# Patient Record
Sex: MALE | Race: BLACK OR AFRICAN AMERICAN | Employment: FULL TIME | URBAN - METROPOLITAN AREA
[De-identification: names, ages, dates, MRNs, and addresses within clinical notes are randomized per-mention and may not be internally consistent; named-entity substitution may affect disease eponyms.]

---

## 2017-01-10 ENCOUNTER — TELEPHONE (OUTPATIENT)
Dept: ENDOCRINOLOGY | Age: 57
End: 2017-01-10

## 2017-01-10 RX ORDER — LEVOTHYROXINE SODIUM 200 UG/1
200 TABLET ORAL
Qty: 90 TAB | Refills: 3 | Status: SHIPPED | OUTPATIENT
Start: 2017-01-10 | End: 2017-03-14 | Stop reason: DRUGHIGH

## 2017-01-10 NOTE — TELEPHONE ENCOUNTER
I called patient's wife and read Dr. Robinson Solis recommendation and she voiced understanding.   ----- Message from Ines Palacios MD sent at 1/10/2017  4:40 PM EST -----  Regarding: RE: Dr. Rick Fried: [phone]  I increased levothyroxine to 200 mcg and sent this to [facility]    He may feel better with treating hypothyroidism, but I still think seeing a counselor will help. Hypothyroidism affects people differently, some people do have mood changes and feel tired. People typically need more sleep, but sleep can be affected.    ----- Message -----     From: Debbie Munoz LPN     Sent: 5/69/4487   9:32 AM       To: Ines Palacios MD  Subject: RE: Dr. Stella Sierra                                     Patient returned my call. He is not taking carafate any longer. He does not take any vitamins or minerals. The only new medication he started was xanax. He takes levothyroxine with his morning coffee which he adds cream and sugar. He waits at least 30-60 minutes before eating. This is not new, this is how he has been taking levothyroxine. He stated at one time in the past he was missing doses, but started taking it more consistently after his last appointment with Dr. Stella Sierra. He may miss a dose here or there, but thinks this happens less than once per week. He complains of depression, anxiety, difficulty sleeping. He wonders if this is related to his thyroid, or if he needs to see a counselor?   ----- Message -----     From: Debbie Munoz LPN     Sent: 9/4/7458   5:00 PM       To: Debbie Munoz LPN  Subject: FW: Dr. Stella Sierra                                     Need to confirm he is not taking any new medications (carafate?), vitamins/minerals, and how he is taking levothyroxine (time of day ect. ).   ----- Message -----     From: Debbie Munoz LPN     Sent: 1/1/7165   4:00 PM       To: Ines Palacios MD  Subject: RE: Dr. Kwasi Mcguire spoke to patient's wife.  She called patient while I was on the phone with her and confirmed he had not missed any doses of levothyroxine in the 8 weeks prior to having labs drawn.   ----- Message -----     From: Jose Garcia MD     Sent: 1/9/2017   2:57 PM       To: Jaret Galeana LPN  Subject: RE: Dr. Ok Bender                                     TSH was 54. At his visit in 10/2016, his TSH was elevated and this was due to him missing doses rather frequently and also taking a medication (carafate) that hindered absorption of levothyroxine. Before adjusting his dose from 175 mcg, I would verify whether he has been taking levothyroxine consistently over the 8 weeks prior to the labs. Even missing 1-2 tablets/week can make a big difference      ----- Message -----     From: Jaret Galeana LPN     Sent: 3/9/6941   2:28 PM       To: Jose Garcia MD  Subject: FW: Dr. Michelle Oliva spoke with patient's wife. She stated patient complains of increased anxiety and depression in the past 1-2 months. He saw his PCP in December who andree labs and recommended increasing levothyroxine to 200 mcg (from 175 mcg). Patient has not made this increase and has continued taking 175 mcg. I called PCP for labs and office note from December. Please advise.   ----- Message -----     From: Alex Major     Sent: 1/9/2017  10:11 AM       To: Jaret Galeana LPN  Subject: Dr. Ok Bender                                         Patients wife Basim Ceballos called regarding patients medication, she stated patient has been having mood swings they may be caused by thyroid.  She would like a call back at [phone]

## 2017-01-30 ENCOUNTER — OFFICE VISIT (OUTPATIENT)
Dept: ENDOCRINOLOGY | Age: 57
End: 2017-01-30

## 2017-01-30 VITALS
SYSTOLIC BLOOD PRESSURE: 137 MMHG | DIASTOLIC BLOOD PRESSURE: 86 MMHG | BODY MASS INDEX: 30.62 KG/M2 | HEART RATE: 73 BPM | HEIGHT: 68 IN | WEIGHT: 202 LBS

## 2017-01-30 DIAGNOSIS — E78.5 DYSLIPIDEMIA: ICD-10-CM

## 2017-01-30 DIAGNOSIS — E11.9 TYPE 2 DIABETES MELLITUS WITHOUT COMPLICATION, WITHOUT LONG-TERM CURRENT USE OF INSULIN (HCC): ICD-10-CM

## 2017-01-30 DIAGNOSIS — I10 HTN (HYPERTENSION), BENIGN: ICD-10-CM

## 2017-01-30 DIAGNOSIS — E89.0 POSTOPERATIVE HYPOTHYROIDISM: Primary | ICD-10-CM

## 2017-01-30 NOTE — PROGRESS NOTES
History of Present Illness: Soraida Fernández is a 55 y.o. male presents for follow-up of hypothyroidism post-surgical  He also has HTN, and dyslipidemia. Hypothyroid since thyroidectomy was done in summer 2012. Dose has fluctuated considerably. Dose was as high as 300 mcg in 2014    TSH was at goal. On equivalent of 150 mcg   TSH much higher in fall 2015 and dose increased from 150 to 175 - thought due to PPI use  Dose further increased to 200 mcg daily earlier this month  Feeling some better. Did have cold intolerance, which may be a little better. Anxiety has improved. Sleep is a little better. Diabetes -  Medications: Taking metformin 1 g BID    Exercise -still exercising. Lipids: rosuvastatin 20 mg - replaces atorvsatatin. .     Elevated BP - + history of microalbuminuria. - BP in 120s at home. Taking amlodipine/benazepril at night. Social:  . Wife is helping with diet        Past Medical History   Diagnosis Date    Diabetes mellitus type II     Dyslipidemia     Goiter     Hypothyroidism      Current Outpatient Prescriptions   Medication Sig    levothyroxine (SYNTHROID) 200 mcg tablet Take 1 Tab by mouth Daily (before breakfast). For thyroid    metFORMIN (GLUCOPHAGE) 1,000 mg tablet TAKE 1 TABLET TWICE A DAY WITH MEALS    rosuvastatin (CRESTOR) 20 mg tablet Take 1 Tab by mouth nightly. For cholesterol. Replaces atorvastatin    amLODIPine-benazepril (LOTREL) 5-40 mg per capsule Take 1 Cap by mouth nightly. For blood pressure. No current facility-administered medications for this visit.       No Known Allergies    Review of Systems:  - Eyes: no blurry vision or double vision  - Cardiovascular: no chest pain  - Respiratory: no shortness of breath  - Musculoskeletal: no myalgias  - Neurological: no numbness/tingling in extremities    Physical Examination:  Visit Vitals    /86    Pulse 73    Ht 5' 8\" (1.727 m)    Wt 202 lb (91.6 kg)    BMI 30.71 kg/m2   -   - General: pleasant, no distress, normal gait   HEENT: hearing intact, EOMI, clear sclera without icterus  - Cardiovascular: regular, normal rate   - Respiratory: normal effort  - Integumentary: no edema  - Psychiatric: normal mood and affect    Data Reviewed:   Component      Latest Ref Rng & Units 10/14/2016 10/14/2016 10/14/2016 10/14/2016          12:00 AM 12:00 AM 12:00 AM 12:00 AM   Glucose      65 - 99 mg/dL    141 (H)   BUN      6 - 24 mg/dL    12   Creatinine      0.76 - 1.27 mg/dL    1.25   GFR est non-AA      >59 mL/min/1.73    64   GFR est AA      >59 mL/min/1.73    74   BUN/Creatinine ratio      9 - 20    10   Sodium      134 - 144 mmol/L    138   Potassium      3.5 - 5.2 mmol/L    4.6   Chloride      97 - 108 mmol/L    95 (L)   CO2      18 - 29 mmol/L    27   Calcium      8.7 - 10.2 mg/dL    9.8   Cholesterol, total      100 - 199 mg/dL   347 (H)    Triglyceride      0 - 149 mg/dL   181 (H)    HDL Cholesterol      >39 mg/dL   61    VLDL, calculated      5 - 40 mg/dL   36    LDL, calculated      0 - 99 mg/dL   250 (H)    Comment         Comment    Hemoglobin A1c, (calculated)      4.8 - 5.6 %  6.4 (H)     Estimated average glucose      mg/dL  137     TSH      0.450 - 4.500 uIU/mL 42.220 (H)          Assessment/Plan:   1. Postoperative hypothyroidism   - very challenging to manage. Doses has fluctuated considerably  - labs on 200 mcg and follow closely with labs until TSH is at goal    2. Type 2 diabetes mellitus without complication, without long-term current use of insulin (HCC)  - continue metformin 1000 mg twice daily can be tolerated. -  Encouraged dietary efforts   3. Dyslipidemia - improved per Dr Bijan Dominguez labs. Continue rosuvastatin    4. HTN (hypertension), benign continue amlodipine/benazepril     Patient Instructions   Thyroid:  Labs on 200 mcg today.   Adjust    Repeat labs again in 6 weeks and in 3 months to follow closely    Diabetes:  Watch carbohydrate intake with meals and aim to eat less than 45 grams per meal.  Continue metformin - 1000 mg twice daily     Goals for blood sugars:  Fasting  (less than 150) -Normal is < 100  Other times -  (less than 180). Normal is < 130    Cholesterol:   Continue rosuvastatin 20 mg. Continue weight loss efforts    Blood pressure  - continue amlodipine/benazepril    Follow-up Disposition:  Return in about 3 months (around 4/30/2017).     Copy sent to:

## 2017-01-30 NOTE — PATIENT INSTRUCTIONS
Thyroid:  Labs on 200 mcg today. Adjust    Repeat labs again in 6 weeks and in 3 months to follow closely    Diabetes:  Watch carbohydrate intake with meals and aim to eat less than 45 grams per meal.  Continue metformin - 1000 mg twice daily     Goals for blood sugars:  Fasting  (less than 150) -Normal is < 100  Other times -  (less than 180). Normal is < 130    Cholesterol:   Continue rosuvastatin 20 mg.       Continue weight loss efforts    Blood pressure  - continue amlodipine/benazepril

## 2017-01-30 NOTE — MR AVS SNAPSHOT
Visit Information Date & Time Provider Department Dept. Phone Encounter #  
 1/30/2017  8:50 AM Criselda Benson, [address] [facility] [phone] Follow-up Instructions Return in about 3 months (around 4/30/2017). Your Appointments 2/6/2017  9:50 AM  
ROUTINE CARE with MD Tenzin [facility]) Appt Note: F/U DIABETES CP0.00  
 [address] [facility] [address] [address]  
[address] [facility] [address]  
  
    
 2/16/2017  2:00 PM  
New Patient with Amado Page NP [facility] ([facility]) Appt Note: new pt for eval for med management for stress and anxiety referred by [employer] [address] [facility] [address] [facility] Jennifer Natarajan [address]  
  
   
 [address] [facility] [address] [id number] Upcoming Health Maintenance Date Due Hepatitis C Screening 1960 Pneumococcal 19-64 Medium Risk (1 of 1 - PPSV23) 3/27/1980 DTaP/Tdap/Td series (1 - Tdap) 3/27/1982 FOBT Q 1 YEAR AGE 50-75 3/27/2011 INFLUENZA AGE 9 TO ADULT 8/1/2016 EYE EXAM RETINAL OR DILATED Q1 8/27/2016 MICROALBUMIN Q1 2/19/2017 FOOT EXAM Q1 2/22/2017 HEMOGLOBIN A1C Q6M 4/14/2017 LIPID PANEL Q1 10/14/2017 Allergies as of 1/30/2017  Review Complete On: 1/30/2017 By: Criselda Benson MD  
 No Known Allergies Current Immunizations  Never Reviewed No immunizations on file. Not reviewed this visit You Were Diagnosed With   
  
 Codes Comments Type 2 diabetes mellitus without complication, without long-term current use of insulin (HCC)    -  Primary ICD-10-CM: E11.9 ICD-9-CM: 250.00 Postoperative hypothyroidism     ICD-10-CM: E89.0 ICD-9-CM: 244.0 Dyslipidemia     ICD-10-CM: E78.5 ICD-9-CM: 272.4 HTN (hypertension), benign     ICD-10-CM: I10 
ICD-9-CM: 401.1 Vitals BP Pulse Height(growth percentile) Weight(growth percentile) BMI Smoking Status 137/86 73 5' 8\" (1.727 m) 202 lb (91.6 kg) 30.71 kg/m2 Former Smoker Vitals History BMI and BSA Data Body Mass Index Body Surface Area 30.71 kg/m 2 2.1 m 2 Preferred Pharmacy Pharmacy Name Phone [address] [phone] Your Updated Medication List  
  
   
This list is accurate as of: 1/30/17  9:45 AM.  Always use your most recent med list. amLODIPine-benazepril 5-40 mg per capsule Commonly known as:  Zabrina Loupe Take 1 Cap by mouth nightly. For blood pressure. levothyroxine 200 mcg tablet Commonly known as:  SYNTHROID Take 1 Tab by mouth Daily (before breakfast). For thyroid  
  
 metFORMIN 1,000 mg tablet Commonly known as:  GLUCOPHAGE  
TAKE 1 TABLET TWICE A DAY WITH MEALS  
  
 rosuvastatin 20 mg tablet Commonly known as:  CRESTOR Take 1 Tab by mouth nightly. For cholesterol. Replaces atorvastatin We Performed the Following HEMOGLOBIN A1C WITH EAG [85517 CPT(R)] T4, FREE Y206167 CPT(R)] TSH 3RD GENERATION [18353 CPT(R)] Follow-up Instructions Return in about 3 months (around 4/30/2017). To-Do List   
 03/13/2017 Lab:  T4, FREE   
  
 03/13/2017 Lab:  TSH 3RD GENERATION Patient Instructions Thyroid: 
Labs on 200 mcg today. Adjust 
 
Repeat labs again in 6 weeks and in 3 months to follow closely Diabetes: 
Watch carbohydrate intake with meals and aim to eat less than 45 grams per meal. 
Continue metformin - 1000 mg twice daily Goals for blood sugars: 
Fasting  (less than 150) -Normal is < 100 Other times -  (less than 180). Normal is < 130 Cholesterol:  
Continue rosuvastatin 20 mg. Continue weight loss efforts Blood pressure 
- continue amlodipine/benazepril Introducing [facility]! Erasmo Gavin introduces [employer] patient portal. Now you can access parts of your medical record, email your doctor's office, and request medication refills online. 1. In your internet browser, go to [email or web address] 2. Click on the First Time User? Click Here link in the Sign In box. You will see the New Member Sign Up page. 3. Enter your [employer] Access Code exactly as it appears below. You will not need to use this code after youve completed the sign-up process. If you do not sign up before the expiration date, you must request a new code. · [employer] Access Code: [email or web address] Expires: 4/30/2017  9:45 AM 
 
4. Enter the last four digits of your Social Security Number (xxxx) and Date of Birth (mm/dd/yyyy) as indicated and click Submit. You will be taken to the next sign-up page. 5. Create a [facility] [employer] ID. This will be your [employer] login ID and cannot be changed, so think of one that is secure and easy to remember. 6. Create a [employer] password. You can change your password at any time. 7. Enter your Password Reset Question and Answer. This can be used at a later time if you forget your password. 8. Enter your e-mail address. You will receive e-mail notification when new information is available in [address] 9. Click Sign Up. You can now view and download portions of your medical record. 10. Click the Download Summary menu link to download a portable copy of your medical information. If you have questions, please visit the Frequently Asked Questions section of the [employer] website. Remember, [employer] is NOT to be used for urgent needs. For medical emergencies, dial 911. Now available from your iPhone and Android! Please provide this summary of care documentation to your next provider. Your primary care clinician is listed as Chichi Encarnacion If you have any questions after today's visit, please call [phone].

## 2017-01-31 LAB
EST. AVERAGE GLUCOSE BLD GHB EST-MCNC: 134 MG/DL
HBA1C MFR BLD: 6.3 % (ref 4.8–5.6)
T4 FREE SERPL-MCNC: 2.84 NG/DL (ref 0.82–1.77)
TSH SERPL DL<=0.005 MIU/L-ACNC: 0.11 UIU/ML (ref 0.45–4.5)

## 2017-02-17 DIAGNOSIS — E78.5 DYSLIPIDEMIA: ICD-10-CM

## 2017-02-17 DIAGNOSIS — E11.9 TYPE 2 DIABETES MELLITUS WITHOUT COMPLICATION, WITHOUT LONG-TERM CURRENT USE OF INSULIN (HCC): ICD-10-CM

## 2017-02-17 DIAGNOSIS — E03.9 HYPOTHYROIDISM, ADULT: ICD-10-CM

## 2017-02-20 ENCOUNTER — OFFICE VISIT (OUTPATIENT)
Dept: BEHAVIORAL/MENTAL HEALTH CLINIC | Age: 57
End: 2017-02-20

## 2017-02-20 VITALS
HEIGHT: 68 IN | BODY MASS INDEX: 29.7 KG/M2 | RESPIRATION RATE: 18 BRPM | SYSTOLIC BLOOD PRESSURE: 175 MMHG | DIASTOLIC BLOOD PRESSURE: 89 MMHG | HEART RATE: 61 BPM | OXYGEN SATURATION: 98 % | WEIGHT: 196 LBS | TEMPERATURE: 98.9 F

## 2017-02-20 DIAGNOSIS — Z86.59 HISTORY OF PANIC ATTACKS: ICD-10-CM

## 2017-02-20 DIAGNOSIS — F32.1 MODERATE SINGLE CURRENT EPISODE OF MAJOR DEPRESSIVE DISORDER (HCC): Primary | ICD-10-CM

## 2017-02-20 RX ORDER — ALPRAZOLAM 1 MG/1
TABLET ORAL
Refills: 0 | COMMUNITY
Start: 2017-02-17 | End: 2017-03-13 | Stop reason: SDUPTHER

## 2017-02-20 RX ORDER — ESCITALOPRAM OXALATE 20 MG/1
TABLET ORAL
Refills: 0 | COMMUNITY
Start: 2017-02-13 | End: 2017-02-20

## 2017-02-20 RX ORDER — FLUOXETINE 10 MG/1
CAPSULE ORAL
Qty: 14 CAP | Refills: 0 | Status: SHIPPED | OUTPATIENT
Start: 2017-02-20 | End: 2017-03-13

## 2017-02-20 RX ORDER — FLUOXETINE HYDROCHLORIDE 20 MG/1
20 CAPSULE ORAL DAILY
Qty: 30 CAP | Refills: 0 | Status: SHIPPED | OUTPATIENT
Start: 2017-02-20 | End: 2017-03-13

## 2017-02-20 NOTE — PROGRESS NOTES
Initial Psychiatric Assessment    ID: Mary Siddiqui is a 55 y.o. yo   male referred by his insurance co for treatment of stress and anxiety. He attends the session with his wife. Chief Complaint: \"I don't feel right. \"    HPI: Mary Siddiqui is a 55 y.o. yo male who presents with symptoms of depression and anxiety. His PMH is significant for DM type 2, dyslipidemia, and hypothyroidism secondary to thyroidectomy in 2012. He has no history of inpt or outpt psychiatric care. He denies a history substance abuse or physical/ sexual/ emotional abuse. He lost a daughter secondary to SIDS and another daughter in an MVC when she was 19yo. He recently lost a cousin and a close friend who passed from a self-inflicted GSW in Nov 8661. Mr. Lucinda Gaytan reports onset of depression and anxiety with a panic attack occurring late Nov- early Dec 2106. Depressive symptoms include sad moods, poor motivation and energy, poor appetite, anhedonia (shopping, yard work, eating out), hypersomnolence, nervousness, hopeless and helpless feelings, and generalized anxiety. He doubts himself and is not as confident in his decisions. 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 He also reports a happy and stable home life with his wife of 11 years and their children. Mr. Lucinda Gaytan worries about small things that occur at the job; issues that he has been adept and confident at handling previously. He notes urges to isolate and spends a large amount of his time at home asleep. His behavior has been noted by his family who are concerned about him. He has several guns that he keeps in a safe, but denies SI. His PCP prescribed Xanax in Dec 2016 and Mr. Lucinda Gaytan thinks that this has been helpful for his anxiety. He uses it sparingly. He denies psychosis, no symptoms of margoth/ hypomania reported. He sees Dr. Maritza Burrows for management of hypothyroidism.  Last TSH 1/30/17= 0.113 and T4= 2.84. Ms. Lemus Found reports that his dose of Synthroid was decreased 2 weeks ago. Past Psychiatric History:  Meds: Current: Xanax 1mg-2mg x 4-4 days out of the week since Dec 2016              Past: Lexapro 20mg- prescribed by his PCP and took x 1 dose and felt jittery and had insomnia   Outpt Treatment: Current: denies                               Past: denies  Past Hospitalizations: denies  Suicide attempts? no  Family hx of suicide? NO  Self injurious behaviors: denies       Past Medical History:  Treva Villela III, MD    Current Meds:   Current Outpatient Prescriptions   Medication Sig Dispense Refill    ALPRAZolam (XANAX) 1 mg tablet   0    FLUoxetine (PROZAC) 10 mg capsule Take 1 capsule by mouth daily x 2 weeks. 14 Cap 0    FLUoxetine (PROZAC) 20 mg capsule Take 1 Cap by mouth daily. 30 Cap 0    levothyroxine (SYNTHROID) 200 mcg tablet Take 1 Tab by mouth Daily (before breakfast). For thyroid 90 Tab 3    metFORMIN (GLUCOPHAGE) 1,000 mg tablet TAKE 1 TABLET TWICE A DAY WITH MEALS 180 Tab 3    rosuvastatin (CRESTOR) 20 mg tablet Take 1 Tab by mouth nightly. For cholesterol. Replaces atorvastatin 90 Tab 3    amLODIPine-benazepril (LOTREL) 5-40 mg per capsule Take 1 Cap by mouth nightly. For blood pressure. 80 Cap 3        PMH:   Past Medical History   Diagnosis Date    Diabetes mellitus type II     Dyslipidemia     Goiter     Hypothyroidism        Family History: denies      Social History:  Family Dynamics: Raised in [place] by a single mother. His father passed secondary to cancer when he was a young child. He has a sister and a brother.  x 11 years to his 2nd wife. He has 2 sons and an adult daughter.    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 .       Vital Signs:   Visit Vitals    /89 (BP 1 Location: Right arm, BP Patient Position: Sitting)    Pulse 61    Temp 98.9 °F (37.2 °C) (Oral)    Resp 18    Ht 5' 8\" (1.727 m)    Wt 88.9 kg (196 lb)    SpO2 98%    BMI 29.8 kg/m2       Labs:   Results for orders placed or performed in visit on 01/30/17   TSH 3RD GENERATION   Result Value Ref Range    TSH 0.113 (L) 0.450 - 4.500 uIU/mL   T4, FREE   Result Value Ref Range    T4, Free 2.84 (H) 0.82 - 1.77 ng/dL   HEMOGLOBIN A1C WITH EAG   Result Value Ref Range    Hemoglobin A1c 6.3 (H) 4.8 - 5.6 %    Estimated average glucose 134 mg/dL       MSE: Mental Status exam: WNL except for    Sensorium  oriented to time, place and person   Relations cooperative    Eye Contact    appropriate   Appearance:  age appropriate and casually dressed   Motor Behavior:  gait stable and within normal limits   Speech:  normal pitch, normal volume and non-pressured   Thought Process: goal directed and logical   Thought Content free of delusions, free of hallucinations and not internally preoccupied    Suicidal ideations none   Homicidal ideations none   Mood:  euthymic   Affect:  euthymic   Memory recent  adequate   Memory remote:  adequate   Concentration:  adequate   Abstraction:  abstract   Insight:  good   Reliability good   Judgment:  good       Assessment: This is a 50yo man with no known genetic loading for a mental health issue and no personal history of substance abuse. He denies a histroy of physical, sexual, or emotional abuse. He lost one daughter secondary to SIDS and another daughter at 25yo in an MVC. He recently lost a cousin and a close friend who passed unexpectedly secondary to a self-inflicted gunshot wound. He is employed, , and has a good support system.  Ms. Quinn Doyle is struggling with sudden onset of first time depression. This episode may be due to cumulative affects of several losses (2 daughters in the past and recently a cousin and close friend), low TSH with elevated T4, or from another issue. Diagnoses:     ICD-10-CM ICD-9-CM    1. Moderate single current episode of major depressive disorder (HCC) F32.1 296.22    2. History of panic attacks Z86.59 V11.8          Plan:  1. Meds/ Labs: Start Prozac 10mg every day x 2 weeks and then increase dose to 20mg every day for depression and anxiety. Rxs provided. He will continue Xanax 0.5-1mg every day PRN severe anxiety. Mr. Fabi Brock reports that he does not need a refill on this medication. the risks and benefits of the proposed medication  the potential medication side effects  GI disturbance, libido decreased, weight gain  patient given opportunity to ask questions  2. Psychotherapy: his wife made him an appt with a therapist in the community  2. Dispo: f/u in 1 month    Risks/ Benefits/ Options of meds d/w patient and patient agrees to these medications. Patient instructed to call with any side effects.     Sarah Mario NP  2/20/2017

## 2017-03-06 ENCOUNTER — TELEPHONE (OUTPATIENT)
Dept: BEHAVIORAL/MENTAL HEALTH CLINIC | Age: 57
End: 2017-03-06

## 2017-03-06 RX ORDER — HYDROXYZINE 50 MG/1
TABLET, FILM COATED ORAL
Qty: 14 TAB | Refills: 0 | Status: SHIPPED | OUTPATIENT
Start: 2017-03-06 | End: 2017-03-13

## 2017-03-06 NOTE — TELEPHONE ENCOUNTER
Spoke with Ms. Rafal Cooper who reports slight improvement to his generalized anxiety and depression. He just increased in Prozac dose to 20mg every day. He is struggling with insomnia and is awake around 12:30-1am every night. Melatonin has not been helpful. Rx for Hydroxyzine 25-50mg qhs sent to his pharmacy. He will call provider back in a few days to inform of his progress.

## 2017-03-06 NOTE — TELEPHONE ENCOUNTER
Pt's wife called and expressed that her  was having problems, I guess with his medication. He can be reached at the number provided, please call back when you have a moment.

## 2017-03-10 ENCOUNTER — HOSPITAL ENCOUNTER (EMERGENCY)
Age: 57
Discharge: HOME | End: 2017-03-10
Attending: EMERGENCY MEDICINE
Payer: COMMERCIAL

## 2017-03-10 VITALS
BODY MASS INDEX: 28.4 KG/M2 | HEIGHT: 68 IN | DIASTOLIC BLOOD PRESSURE: 96 MMHG | RESPIRATION RATE: 16 BRPM | TEMPERATURE: 98.5 F | SYSTOLIC BLOOD PRESSURE: 158 MMHG | WEIGHT: 187.38 LBS | HEART RATE: 66 BPM | OXYGEN SATURATION: 96 %

## 2017-03-10 DIAGNOSIS — G47.00 INSOMNIA, UNSPECIFIED TYPE: ICD-10-CM

## 2017-03-10 DIAGNOSIS — R42 DIZZINESS: ICD-10-CM

## 2017-03-10 DIAGNOSIS — R00.0 TACHYCARDIA: Primary | ICD-10-CM

## 2017-03-10 LAB
ALBUMIN SERPL BCP-MCNC: 4.1 G/DL (ref 3.5–5)
ALBUMIN/GLOB SERPL: 1 {RATIO} (ref 1.1–2.2)
ALP SERPL-CCNC: 72 U/L (ref 45–117)
ALT SERPL-CCNC: 20 U/L (ref 12–78)
ANION GAP BLD CALC-SCNC: 10 MMOL/L (ref 5–15)
AST SERPL W P-5'-P-CCNC: 11 U/L (ref 15–37)
BASOPHILS # BLD AUTO: 0 K/UL (ref 0–0.1)
BASOPHILS # BLD: 0 % (ref 0–1)
BILIRUB SERPL-MCNC: 0.6 MG/DL (ref 0.2–1)
BUN SERPL-MCNC: 8 MG/DL (ref 6–20)
BUN/CREAT SERPL: 8 (ref 12–20)
CALCIUM SERPL-MCNC: 9.2 MG/DL (ref 8.5–10.1)
CHLORIDE SERPL-SCNC: 100 MMOL/L (ref 97–108)
CO2 SERPL-SCNC: 29 MMOL/L (ref 21–32)
CREAT SERPL-MCNC: 1.02 MG/DL (ref 0.7–1.3)
EOSINOPHIL # BLD: 0 K/UL (ref 0–0.4)
EOSINOPHIL NFR BLD: 0 % (ref 0–7)
ERYTHROCYTE [DISTWIDTH] IN BLOOD BY AUTOMATED COUNT: 12 % (ref 11.5–14.5)
GLOBULIN SER CALC-MCNC: 4.2 G/DL (ref 2–4)
GLUCOSE SERPL-MCNC: 163 MG/DL (ref 65–100)
HCT VFR BLD AUTO: 42.5 % (ref 36.6–50.3)
HGB BLD-MCNC: 14.3 G/DL (ref 12.1–17)
LYMPHOCYTES # BLD AUTO: 17 % (ref 12–49)
LYMPHOCYTES # BLD: 1.4 K/UL (ref 0.8–3.5)
MCH RBC QN AUTO: 30.8 PG (ref 26–34)
MCHC RBC AUTO-ENTMCNC: 33.6 G/DL (ref 30–36.5)
MCV RBC AUTO: 91.4 FL (ref 80–99)
MONOCYTES # BLD: 0.5 K/UL (ref 0–1)
MONOCYTES NFR BLD AUTO: 6 % (ref 5–13)
NEUTS SEG # BLD: 6.3 K/UL (ref 1.8–8)
NEUTS SEG NFR BLD AUTO: 77 % (ref 32–75)
PLATELET # BLD AUTO: 297 K/UL (ref 150–400)
POTASSIUM SERPL-SCNC: 3.1 MMOL/L (ref 3.5–5.1)
PROT SERPL-MCNC: 8.3 G/DL (ref 6.4–8.2)
RBC # BLD AUTO: 4.65 M/UL (ref 4.1–5.7)
SODIUM SERPL-SCNC: 139 MMOL/L (ref 136–145)
T4 FREE SERPL-MCNC: 2 NG/DL (ref 0.8–1.5)
TROPONIN I SERPL-MCNC: <0.04 NG/ML
WBC # BLD AUTO: 8.2 K/UL (ref 4.1–11.1)

## 2017-03-10 PROCEDURE — 85025 COMPLETE CBC W/AUTO DIFF WBC: CPT | Performed by: STUDENT IN AN ORGANIZED HEALTH CARE EDUCATION/TRAINING PROGRAM

## 2017-03-10 PROCEDURE — 36415 COLL VENOUS BLD VENIPUNCTURE: CPT | Performed by: STUDENT IN AN ORGANIZED HEALTH CARE EDUCATION/TRAINING PROGRAM

## 2017-03-10 PROCEDURE — 84484 ASSAY OF TROPONIN QUANT: CPT | Performed by: STUDENT IN AN ORGANIZED HEALTH CARE EDUCATION/TRAINING PROGRAM

## 2017-03-10 PROCEDURE — 84439 ASSAY OF FREE THYROXINE: CPT | Performed by: EMERGENCY MEDICINE

## 2017-03-10 PROCEDURE — 80053 COMPREHEN METABOLIC PANEL: CPT | Performed by: STUDENT IN AN ORGANIZED HEALTH CARE EDUCATION/TRAINING PROGRAM

## 2017-03-10 PROCEDURE — 93005 ELECTROCARDIOGRAM TRACING: CPT

## 2017-03-10 PROCEDURE — 99283 EMERGENCY DEPT VISIT LOW MDM: CPT

## 2017-03-10 RX ORDER — ZOLPIDEM TARTRATE 10 MG/1
10 TABLET ORAL
Qty: 10 TAB | Refills: 0 | Status: SHIPPED | OUTPATIENT
Start: 2017-03-10 | End: 2017-03-28

## 2017-03-10 NOTE — ED TRIAGE NOTES
Triage note: pt started with feeling like his heart was racing about 2 hours ago with dizziness. Pt currently denies heart racing. Pt recently started on Prozac and after problems sleeping. Pt report tingling in both hands when his heart was racing.

## 2017-03-11 LAB
ATRIAL RATE: 69 BPM
CALCULATED P AXIS, ECG09: 33 DEGREES
CALCULATED R AXIS, ECG10: 9 DEGREES
CALCULATED T AXIS, ECG11: 32 DEGREES
DIAGNOSIS, 93000: NORMAL
P-R INTERVAL, ECG05: 132 MS
Q-T INTERVAL, ECG07: 448 MS
QRS DURATION, ECG06: 108 MS
QTC CALCULATION (BEZET), ECG08: 480 MS
VENTRICULAR RATE, ECG03: 69 BPM

## 2017-03-11 NOTE — ED PROVIDER NOTES
HPI Comments: 55 y.o. male with past medical history significant for DM, goiter, dyslipidemia, hypothyroidism, HTN, and anxiety who presents from home with chief complaint of anxiety. Pt reports taking Prozac for approximately 3 weeks and has been experiencing insomnia for 1-2 days. Pt states that he was unable to sleep last night and felt anxious throughout the day after waking up this morning. Pt also states that he feels as though his heart is \"racing\" and doesn't \"feel right\". He denies having any syncope, chest pain, headache, or abdominal pain. Patient denies any hx of heart problems. There are no other acute medical concerns at this time. Social hx: former smoker, rare EtOH use, no drug use  PCP: Layne Saldaña MD    Note written by Raphael Young, as dictated by Jessica Wan MD 7:28 PM      The history is provided by the patient. No  was used. Past Medical History:   Diagnosis Date    Diabetes mellitus type II     Dyslipidemia     Goiter     Hypertension     Hypothyroidism     Psychiatric disorder     aniexty       Past Surgical History:   Procedure Laterality Date    HX HEMORRHOIDECTOMY      HX ORTHOPAEDIC      back    HX THYROIDECTOMY      summer 2012         Family History:   Problem Relation Age of Onset    Diabetes Mother     Diabetes Brother     Diabetes Sister        Social History     Social History    Marital status:      Spouse name: N/A    Number of children: N/A    Years of education: N/A     Occupational History    Not on file. Social History Main Topics    Smoking status: Former Smoker     Quit date: 1/1/1984    Smokeless tobacco: Not on file    Alcohol use Yes      Comment: infrequent    Drug use: No    Sexual activity: Not on file     Other Topics Concern    Not on file     Social History Narrative         ALLERGIES: Review of patient's allergies indicates no known allergies.     Review of Systems   Constitutional: Negative for fever. Eyes: Negative for visual disturbance. Respiratory: Negative for cough, shortness of breath and wheezing. Cardiovascular: Positive for palpitations. Negative for chest pain and leg swelling. Gastrointestinal: Negative for abdominal pain, diarrhea, nausea and vomiting. Genitourinary: Negative for dysuria. Musculoskeletal: Negative. Negative for back pain and neck stiffness. Skin: Negative for rash. Neurological: Negative. Negative for syncope and headaches. Psychiatric/Behavioral: Negative for confusion. The patient is nervous/anxious. All other systems reviewed and are negative. Vitals:    03/10/17 1808   BP: (!) 164/91   Pulse: 95   Resp: 18   Temp: 98.5 °F (36.9 °C)   SpO2: 97%   Weight: 85 kg (187 lb 6 oz)   Height: 5' 8\" (1.727 m)            Physical Exam   Constitutional: He appears well-developed and well-nourished. No distress. HENT:   Head: Normocephalic. Eyes: Pupils are equal, round, and reactive to light. Neck: Normal range of motion. Cardiovascular: Normal rate and regular rhythm. No murmur heard. Pulmonary/Chest: Effort normal and breath sounds normal. No respiratory distress. Abdominal: Soft. There is no tenderness. Musculoskeletal: Normal range of motion. He exhibits no edema. Neurological: He is alert. He has normal strength. No cranial nerve deficit. Skin: Skin is warm and dry. Psychiatric: He has a normal mood and affect. His behavior is normal.   Nursing note and vitals reviewed. Note written by Raphael Arrington, as dictated by Keke Busby MD 7:28 PM    [facility]  ED Course       Procedures  ED EKG interpretation:  Rhythm: normal sinus rhythm; and regular . Rate (approx.): 69; Axis: normal; ST/T wave: non-specific changes. Note written by Raphael Arrington, as dictated by Keke Busby MD 7:28 PM      Advised pt that he should decrease prozac to 10 mg ( d/t insomnia).  Also , he needs to talk c his pcp about his synthroid dose - free t4 level is slightly elevated. Pt requested something to help him sleep.

## 2017-03-11 NOTE — DISCHARGE INSTRUCTIONS
Dizziness: Care Instructions  Your Care Instructions  Dizziness is the feeling of unsteadiness or fuzziness in your head. It is different than having vertigo, which is a feeling that the room is spinning or that you are moving or falling. It is also different from lightheadedness, which is the feeling that you are about to faint. It can be hard to know what causes dizziness. Some people feel dizzy when they have migraine headaches. Sometimes bouts of flu can make you feel dizzy. Some medical conditions, such as heart problems or high blood pressure, can make you feel dizzy. Many medicines can cause dizziness, including medicines for high blood pressure, pain, or anxiety. If a medicine causes your symptoms, your doctor may recommend that you stop or change the medicine. If it is a problem with your heart, you may need medicine to help your heart work better. If there is no clear reason for your symptoms, your doctor may suggest watching and waiting for a while to see if the dizziness goes away on its own. Follow-up care is a key part of your treatment and safety. Be sure to make and go to all appointments, and call your doctor if you are having problems. It's also a good idea to know your test results and keep a list of the medicines you take. How can you care for yourself at home? · If your doctor recommends or prescribes medicine, take it exactly as directed. Call your doctor if you think you are having a problem with your medicine. · Do not drive while you feel dizzy. · Try to prevent falls. Steps you can take include:  ¨ Using nonskid mats, adding grab bars near the tub, and using night-lights. ¨ Clearing your home so that walkways are free of anything you might trip on. ¨ Letting family and friends know that you have been feeling dizzy. This will help them know how to help you. When should you call for help? Call 911 anytime you think you may need emergency care.  For example, call if:  · You passed out (lost consciousness). · You have dizziness along with symptoms of a heart attack. These may include:  ¨ Chest pain or pressure, or a strange feeling in the chest.  ¨ Sweating. ¨ Shortness of breath. ¨ Nausea or vomiting. ¨ Pain, pressure, or a strange feeling in the back, neck, jaw, or upper belly or in one or both shoulders or arms. ¨ Lightheadedness or sudden weakness. ¨ A fast or irregular heartbeat. · You have symptoms of a stroke. These may include:  ¨ Sudden numbness, tingling, weakness, or loss of movement in your face, arm, or leg, especially on only one side of your body. ¨ Sudden vision changes. ¨ Sudden trouble speaking. ¨ Sudden confusion or trouble understanding simple statements. ¨ Sudden problems with walking or balance. ¨ A sudden, severe headache that is different from past headaches. Call your doctor now or seek immediate medical care if:  · You feel dizzy and have a fever, headache, or ringing in your ears. · You have new or increased nausea and vomiting. · Your dizziness does not go away or comes back. Watch closely for changes in your health, and be sure to contact your doctor if:  · You do not get better as expected. Where can you learn more? Go to http://patrick-yana.info/. Enter C054 in the search box to learn more about \"Dizziness: Care Instructions. \"  Current as of: May 27, 2016  Content Version: 11.1  © 0694-4542 [employer]. Care instructions adapted under license by [employer] (which disclaims liability or warranty for this information). If you have questions about a medical condition or this instruction, always ask your healthcare professional. [place] [address] any warranty or liability for your use of this information. Insomnia: Care Instructions  Your Care Instructions  Insomnia is the inability to sleep well. It is a common problem for most people at some time.  Insomnia may make it hard for you to get to sleep, stay asleep, or sleep as long as you need to. This can make you tired and grouchy during the day. It can also make you forgetful, less effective at work, and unhappy. Insomnia can be caused by conditions such as depression or anxiety. Pain can also affect your ability to sleep. When these problems are solved, the insomnia usually clears up. But sometimes bad sleep habits can cause insomnia. If insomnia is affecting your work or your enjoyment of life, you can take steps to improve your sleep. Follow-up care is a key part of your treatment and safety. Be sure to make and go to all appointments, and call your doctor if you are having problems. It's also a good idea to know your test results and keep a list of the medicines you take. How can you care for yourself at home? What to avoid  · Do not have drinks with caffeine, such as coffee or black tea, for 8 hours before bed. · Do not smoke or use other types of tobacco near bedtime. Nicotine is a stimulant and can keep you awake. · Avoid drinking alcohol late in the evening, because it can cause you to wake in the middle of the night. · Do not eat a big meal close to bedtime. If you are hungry, eat a light snack. · Do not drink a lot of water close to bedtime, because the need to urinate may wake you up during the night. · Do not read or watch TV in bed. Use the bed only for sleeping and sexual activity. What to try  · Go to bed at the same time every night, and wake up at the same time every morning. Do not take naps during the day. · Keep your bedroom quiet, dark, and cool. · Sleep on a comfortable pillow and mattress. · If watching the clock makes you anxious, turn it facing away from you so you cannot see the time. · If you worry when you lie down, start a worry book. Well before bedtime, write down your worries, and then set the book and your concerns aside.   · Try meditation or other relaxation techniques before you go to bed. · If you cannot fall asleep, get up and go to another room until you feel sleepy. Do something relaxing. Repeat your bedtime routine before you go to bed again. · Make your house quiet and calm about an hour before bedtime. Turn down the lights, turn off the TV, log off the computer, and turn down the volume on music. This can help you relax after a busy day. When should you call for help? Watch closely for changes in your health, and be sure to contact your doctor if:  · Your efforts to improve your sleep do not work. · Your insomnia gets worse. · You have been feeling down, depressed, or hopeless or have lost interest in things that you usually enjoy. Where can you learn more? Go to http://patrick-yana.info/. Enter P513 in the search box to learn more about \"Insomnia: Care Instructions. \"  Current as of: July 26, 2016  Content Version: 11.1  © 2263-6462 [employer]. Care instructions adapted under license by [employer] (which disclaims liability or warranty for this information). If you have questions about a medical condition or this instruction, always ask your healthcare professional. [address] any warranty or liability for your use of this information.

## 2017-03-13 ENCOUNTER — TELEPHONE (OUTPATIENT)
Dept: ENDOCRINOLOGY | Age: 57
End: 2017-03-13

## 2017-03-13 ENCOUNTER — TELEPHONE (OUTPATIENT)
Dept: BEHAVIORAL/MENTAL HEALTH CLINIC | Age: 57
End: 2017-03-13

## 2017-03-13 DIAGNOSIS — E89.0 POSTOPERATIVE HYPOTHYROIDISM: ICD-10-CM

## 2017-03-13 DIAGNOSIS — E03.9 ACQUIRED HYPOTHYROIDISM: Primary | ICD-10-CM

## 2017-03-13 RX ORDER — BUSPIRONE HYDROCHLORIDE 5 MG/1
5 TABLET ORAL
Qty: 90 TAB | Refills: 0 | Status: SHIPPED | OUTPATIENT
Start: 2017-03-13 | End: 2017-04-03 | Stop reason: SDUPTHER

## 2017-03-13 RX ORDER — ALPRAZOLAM 1 MG/1
1 TABLET ORAL
Qty: 20 TAB | Refills: 0 | Status: SHIPPED | OUTPATIENT
Start: 2017-03-13 | End: 2017-04-03 | Stop reason: SDUPTHER

## 2017-03-13 NOTE — PROGRESS NOTES
Spoke with patient who reports increase in Prozac helped his depression, but significantly worsened his anxiety and insomnia. He also felt dizzy. He went to the ED last week. EKG was SR and symptoms went away after he stopped taking the Prozac. He is not taking Hydroxyzine. He was prescribed Ambien 10mg in the ER and is taking this most nights. He admits to taking 2 pills of Ambien last night. He was told not to overtake his medication and that Ambien can be habit forming and he should not use it every night. He affirmed understanding. Ms. Archie Nunes denies current depression but feels significant anxiety. He feels overwhelmed with small things that used to not bother him. He denies SI. He is asking for Xanax PRN and is anxious as he only has 1 pill left from rx provided by his PCP. Importance of not overusing controlled medications d/w Mr. Archie Nunes. Rx called in for Xanax 1mg every day #20 to his pharmacy ([facility]). He also agreed to start Buspar 5mg tid for anxiety. Goal of treatment d/w him: Buspar is main medication for anxiety, Xanax provided as Buspar dose is increased. His appt for 3/20 was cancelled and  staff was given his wife's # (#[phone]) to reschedule for early April. Mr. Archie Nunes was told to call back should he have further issues/ concerns.

## 2017-03-13 NOTE — TELEPHONE ENCOUNTER
Patient's wife called stating when she got home on Friday, patient was very anxious and was pacing. He decided to go to the ED for increased anxiety and was told his thyroid hormone level was abnormal. Patient stated he was taking levothyroxine 200 mcg 1/2 tab on Sundays and a whole tab on all other days. Per ED doctor, patient did not take any levothyroxine on Saturday, he took 1/2 tab on Sunday, and has not yet taken any today because he has been busy. Patient had labs drawn again this morning with an order from Dr. Roger Burks. Patient complains of poor appetite, weight loss, anxiety, trouble sleeping.

## 2017-03-13 NOTE — TELEPHONE ENCOUNTER
Pt called on Friday asking if you had anyone spoken with you regarding him not being able to sleep and having problem with his meds. It also seems that he went to the ER on Friday 3/10 for dizziness,insominia, and irregular heartbeat. His wife also called on Friday. Please call them back as soon as possible.

## 2017-03-14 ENCOUNTER — DOCUMENTATION ONLY (OUTPATIENT)
Dept: BEHAVIORAL/MENTAL HEALTH CLINIC | Age: 57
End: 2017-03-14

## 2017-03-14 LAB
T4 FREE SERPL-MCNC: 1.71 NG/DL (ref 0.82–1.77)
TSH SERPL DL<=0.005 MIU/L-ACNC: 0.09 UIU/ML (ref 0.45–4.5)

## 2017-03-14 RX ORDER — LEVOTHYROXINE SODIUM 175 UG/1
TABLET ORAL
Qty: 90 TAB | Refills: 3 | Status: SHIPPED | OUTPATIENT
Start: 2017-03-14 | End: 2017-07-28 | Stop reason: DRUGHIGH

## 2017-03-14 NOTE — TELEPHONE ENCOUNTER
Joen Rush,  His Free T4 levels have fluctuated a lot over last 2 months, I suspect because how he is taking the levothyroxine has changed. The TSH may lag behind the lowering of the levothyroxine dose. Please confirm how much levothyroxine he has taken over last 4-8 weeks. Has he missed any doses, when did he start skipping or splitting doses? We'll lower his dose from the 200 mcg to a lower dose, but by how much will depend on what he reports.

## 2017-03-14 NOTE — TELEPHONE ENCOUNTER
Called Mr Quinn oDyle  Reviewed labs. Will have him decrease levothyroxine to 175 mcg x 6.5 days a week (162.5 mcg/day average dose)    Will mail lab order for repeat labs in 4 weeks, but can go in 2 weeks if he feels poorly. He las lost about 10% of his body weight since the fall, so this may have decreased his needs some. Reviewed importance of taking levothyroxine consistently. He says he is doing this. Recommended using pill sorter. Discussed how a beta-blocker, such as carvedilol, may help blood pressure and also help diminish his physiological response to stress, which may help symptoms. Will see what Valerie Tracy thinks.

## 2017-03-14 NOTE — TELEPHONE ENCOUNTER
Francesco Mendoza,  TSH was 0.085 (low) and Free T4 was 1.74 (high-normal)  These labs indicate that the amount of levothyroxine he has been getting over last month is too much

## 2017-03-14 NOTE — TELEPHONE ENCOUNTER
I called patient and read Dr. Brennen Robertson recommendation and he voiced understanding. Patient asked what can be done to make his thyroid more manageable? He is becoming frustrated with his thyroid management and stated it is effecting him greatly, and is starting to effect his work. Patient would appreciate a return call from Dr. Bacilio Larsen to discuss his concerns.

## 2017-03-14 NOTE — TELEPHONE ENCOUNTER
200 mcg x 6.5 tablets/week = 185 mcg/day. This was still too much. Please have him decrease to 175 mcg tablet and continue to take 1/2 tablet on Sundays (6.5 tablets/week) for 162.5 mcg/day    He can have TSH and free  T4 checked in 8 weeks.

## 2017-03-14 NOTE — TELEPHONE ENCOUNTER
I spoke with patient and confirmed he has been taking levothyroxine 200 mcg every day and 1/2 tab on Sundays for the past 8 + weeks. Starting this past Saturday (3/11/17) patient held levothyroxine all together. On Sunday (3/12/17) he started taking 1/2 tablet and has continued taking 1/2 tablet everyday.

## 2017-03-14 NOTE — PROGRESS NOTES
Spoke with patient on the phone yesterday. He reported increased anxiety when Prozac dose was increased from 10mg to 20mg, however depression improved. He stopped taking the Prozac and feels better. He is still struggling with generalized anxiety and feels overwhelmed by issues that previously wouldn't have bothered him. He was prescribed Ambien 10mg in the ED and has been taking this most nights. The night before he took 2 pills. Informed Mr. Marianne Gomez of risk of dependency with Ambien, and not to overuse the medication. He was told to try and not take it every night. He denies SI. D/C Prozac and start Buspar 5mg tid for anxiety. Rx sent to his pharmacy. He is asking for a rx for Xanax 1mg- says he has one pill left from his PCP and that he is nervous he will run out. Rx for #20 pills sent to his pharmacy and risk of dependency/ tolerance d/w him. Goal is for Buspar to cover anxiety and Xanax should be used sparingly for panic episodes. Ms. Marianne Gomez affirmed understanding. Will f/u in early April. [facility] desk staff given his wife's # for rescheduling his appt. He was told to call back should he have further issues/ concerns.

## 2017-03-27 ENCOUNTER — TELEPHONE (OUTPATIENT)
Dept: BEHAVIORAL/MENTAL HEALTH CLINIC | Age: 57
End: 2017-03-27

## 2017-03-27 NOTE — TELEPHONE ENCOUNTER
Wife called for , he has some questions and issues with the medication and he would like to speak with you about it. He does have an appt coming up on April 3.

## 2017-03-28 RX ORDER — ZOLPIDEM TARTRATE 5 MG/1
5 TABLET ORAL
Qty: 7 TAB | Refills: 0 | Status: SHIPPED | OUTPATIENT
Start: 2017-03-28 | End: 2017-04-03

## 2017-03-28 NOTE — TELEPHONE ENCOUNTER
Spoke with patient who reports Buspar is helpful for his anxiety and he no longer feels depressed. No side effects. He is not sleeping well- wakes up frequently in the middle of the night. Doesn't have Ambien from ED anymore. Poor sleep is effecting his daytime energy and motivation level. Called in rx for Ambien 5mg qhs #7 to his pharmacy and will see him next week for f/u.

## 2017-03-30 LAB
T4 FREE SERPL-MCNC: 1.82 NG/DL (ref 0.82–1.77)
TSH SERPL DL<=0.005 MIU/L-ACNC: 0.32 UIU/ML (ref 0.45–4.5)

## 2017-03-31 DIAGNOSIS — E89.0 POSTOPERATIVE HYPOTHYROIDISM: Primary | ICD-10-CM

## 2017-03-31 DIAGNOSIS — E11.9 TYPE 2 DIABETES MELLITUS WITHOUT COMPLICATION, WITHOUT LONG-TERM CURRENT USE OF INSULIN (HCC): ICD-10-CM

## 2017-03-31 NOTE — TELEPHONE ENCOUNTER
TSH is improved, but remains low. May still be increasing. Due to problems with anxiety, etc, will have him lower dose to 150 mcg daily (can take 175 mcg - 1/2 on Wed and Sundays for avg daily dose of 150 mcg daily)  Will mail lab letter.

## 2017-03-31 NOTE — LETTER
3/31/2017 4:55 PM 
 
Mr. Ridge Bermeo [address] [facility] [id number] Dear Ridge Bermeo: Please find your most recent results below. Resulted Orders TSH 3RD GENERATION Result Value Ref Range TSH 0.321 (L) 0.450 - 4.500 uIU/mL Narrative Performed at:  [address]  [id number] : Aminata Reyes MD, Phone:  [phone] T4, FREE Result Value Ref Range T4, Free 1.82 (H) 0.82 - 1.77 ng/dL RECOMMENDATIONS: 
TSH (Thyroid Stimulating Hormone): This is a pituitary hormone used to assess thyroid function. It is low, but improved and nearing normal 
 
Free T4- this remains slightly elevated. Please lower levothyroxine dose to 150 mcg. To finish up 175 mcg tablets, can take 1/2 tablet on Wednesdays and Sundays for 6 tablets total/week and 150 mcg/day average dose. Please let us know if you would like to change to 150 mcg strength. Please have labs repeated prior to your visit next month (will include urine test and diabetes and cholesterol related labs too) Please call me if you have any questions: [phone] Sincerely, 
 
 
Rivera To MD

## 2017-04-03 ENCOUNTER — OFFICE VISIT (OUTPATIENT)
Dept: BEHAVIORAL/MENTAL HEALTH CLINIC | Age: 57
End: 2017-04-03

## 2017-04-03 VITALS
HEIGHT: 68 IN | SYSTOLIC BLOOD PRESSURE: 146 MMHG | HEART RATE: 67 BPM | WEIGHT: 185 LBS | DIASTOLIC BLOOD PRESSURE: 88 MMHG | BODY MASS INDEX: 28.04 KG/M2 | OXYGEN SATURATION: 98 %

## 2017-04-03 DIAGNOSIS — F32.1 MODERATE SINGLE CURRENT EPISODE OF MAJOR DEPRESSIVE DISORDER (HCC): Primary | ICD-10-CM

## 2017-04-03 DIAGNOSIS — Z86.59 HISTORY OF PANIC ATTACKS: ICD-10-CM

## 2017-04-03 RX ORDER — ALPRAZOLAM 1 MG/1
1 TABLET ORAL
Qty: 30 TAB | Refills: 0 | Status: SHIPPED | OUTPATIENT
Start: 2017-04-03 | End: 2017-05-05 | Stop reason: SDUPTHER

## 2017-04-03 RX ORDER — BUSPIRONE HYDROCHLORIDE 15 MG/1
15 TABLET ORAL 2 TIMES DAILY
Qty: 60 TAB | Refills: 0 | Status: SHIPPED | OUTPATIENT
Start: 2017-04-03 | End: 2017-04-28

## 2017-04-03 RX ORDER — MIRTAZAPINE 7.5 MG/1
7.5 TABLET, FILM COATED ORAL
Qty: 30 TAB | Refills: 0 | Status: SHIPPED | OUTPATIENT
Start: 2017-04-03 | End: 2017-05-24 | Stop reason: SINTOL

## 2017-04-03 NOTE — PROGRESS NOTES
CHIEF COMPLAINT:  Lynsey Arvizu is a 65 y.o. male and was seen today for follow-up of psychiatric condition and psychotropic medication management. He attends the session with his wife. HPI:    Lynsey Arvizu is a 65 y.o. yo male who presents with symptoms of depression and anxiety. His PMH is significant for DM type 2, dyslipidemia, and hypothyroidism secondary to thyroidectomy in 2012. He has no history of inpt or outpt psychiatric care. He denies a history substance abuse or physical/ sexual/ emotional abuse. He lost a daughter secondary to SIDS and another daughter in an MVC when she was 18yo. He recently lost a cousin and a close friend who passed from a self-inflicted GSW in Nov 0172. Mr. Aaron Starr reports onset of depression and anxiety with a panic attack occurring late Nov- early Dec 2106. Depressive symptoms include sad moods, poor motivation and energy, poor appetite, anhedonia (shopping, yard work, eating out), hypersomnolence, nervousness, hopeless and helpless feelings, and generalized anxiety. He doubts himself and is not as confident in his decisions. 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 He also reports a happy and stable home life with his wife of 11 years and their children. Mr. Aaron Starr worries about small things that occur at the job; issues that he has been adept and confident at handling previously. He notes urges to isolate and spends a large amount of his time at home asleep. His behavior has been noted by his family who are concerned about him. He has several guns that he keeps in a safe, but denies SI. His PCP prescribed Xanax in Dec 2016 and Mr. Aaron Starr thinks that this has been helpful for his anxiety. He uses it sparingly. He denies psychosis, no symptoms of margoth/ hypomania reported. He sees Dr. Emilia Gonsalves for management of thyroid issues. FAMILY/SOCIAL HX:  x 11 years to his 2nd wife. He has 2 sons and an adult daughter. 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, [employer]     REVIEW OF SYSTEMS:  Psychiatric:  depression and anxiety  Appetite:improved and weight decrease by 11 lbs. Sleep: does not feel rested and poor with DMS (maintaining sleep)   Neuro: none reported     Visit Vitals    /88 (BP 1 Location: Left arm, BP Patient Position: Sitting)    Pulse 67    Ht 5' 8\" (1.727 m)    Wt 83.9 kg (185 lb)    SpO2 98%    BMI 28.13 kg/m2       Side Effects:  none    MENTAL STATUS EXAM:   Sensorium  oriented to time, place and person   Relations cooperative   Appearance:  age appropriate and casually dressed   Motor Behavior:  gait stable and within normal limits   Speech:  normal pitch, normal volume and non-pressured   Thought Process: goal directed and logical   Thought Content free of delusions, free of hallucinations and not internally preoccupied    Suicidal ideations none   Homicidal ideations none   Mood:  anxious   Affect:  mood-congruent   Memory recent  adequate   Memory remote:  adequate   Concentration:  adequate   Abstraction:  abstract   Insight:  good   Reliability good   Judgment:  good     MEDICAL DECISION MAKING:  Problems addressed today:    ICD-10-CM ICD-9-CM    1. Moderate single current episode of major depressive disorder (HCC) F32.1 296.22    2. History of panic attacks Z86.59 V11.8        Assessment:   Brody Loo is responding to treatment, symptoms are continued depression and anxiety. He underwent a few adjustements to his medications since last in the office- started on Ambien 5mg qhs and Buspar 5mg tid. He is still taking the Xanax 1mg qhs. Last TSH was 1.82 on 3/29 and, according to Dr. Kimberlyn Quach note, this is gradually improving. He has noted benefit to his moods with Buspar, but still struggles with depression and anxiety in general. He reports continued amotivation and apathy, anhedonia, isolating behaviors, and poor sleep.  He is only getting 4 hrs of sleep with Ambien, either 5mg to 10mg. He is stressed with his job. His wife says that, at times, it is like a dark cloud comes over him. He reports that his appetite has slowly been improving but is still low. He attended therapy sessions x 2 with Eugene Hernández but is not interested in continuing with therapy at this time. Current Outpatient Prescriptions   Medication Sig Dispense Refill    busPIRone (BUSPAR) 15 mg tablet Take 1 Tab by mouth two (2) times a day. 60 Tab 0    ALPRAZolam (XANAX) 1 mg tablet Take 1 Tab by mouth daily as needed for Anxiety. 30 Tab 0    mirtazapine (REMERON) 7.5 mg tablet Take 1 Tab by mouth nightly. 30 Tab 0    levothyroxine (SYNTHROID) 175 mcg tablet One tablet most days and 1/2 tablet on Sundays for 6.5 tablets/week (162.5 mcg/day average daily dose) 90 Tab 3    metFORMIN (GLUCOPHAGE) 1,000 mg tablet TAKE 1 TABLET TWICE A DAY WITH MEALS 180 Tab 3    rosuvastatin (CRESTOR) 20 mg tablet Take 1 Tab by mouth nightly. For cholesterol. Replaces atorvastatin 90 Tab 3    amLODIPine-benazepril (LOTREL) 5-40 mg per capsule Take 1 Cap by mouth nightly. For blood pressure. 90 Cap 3       Plan:   1. Medications/ Labs: Increase Buspar to 15mg bid for anxiety. Continue Xanax 1mg every day for severe anxiety. Start Remeron 7.5mg qhs for depression and anxiety (may also help with sleep and appetite). Rxs provided. He was encouraged in good self-care, i.e. Regular exercise, healthy diet, spending time with his family. 2.  Counseling and coordination of care including instructions for treatment, risks/benefits, risk factor reduction and patient/family education. He agrees with the plan. Patient instructed to call with any side effects, questions or issues. 3.  Follow-up Disposition:  Return in about 1 month (around 5/3/2017).     4/3/2017  Bea Manrique NP

## 2017-04-05 ENCOUNTER — TELEPHONE (OUTPATIENT)
Dept: BEHAVIORAL/MENTAL HEALTH CLINIC | Age: 57
End: 2017-04-05

## 2017-04-05 NOTE — TELEPHONE ENCOUNTER
Wife called and said that  is sluggish on the new medication that he was just started last visit.  She said he would like a call back as soon as you can

## 2017-04-06 NOTE — TELEPHONE ENCOUNTER
Returned call and spoke with patient who reports he felt slow and intolerable sluggishness when he was taking Remeron qhs. He did not take it last night and instead took a Xanax and feels better today. He also decreased his Buspar dose on his own. He was told to call back and report on his progress.

## 2017-04-22 LAB
ALBUMIN SERPL-MCNC: 4.5 G/DL (ref 3.5–5.5)
ALBUMIN/CREAT UR: 4.8 MG/G CREAT (ref 0–30)
ALBUMIN/GLOB SERPL: 1.9 {RATIO} (ref 1.2–2.2)
ALP SERPL-CCNC: 60 IU/L (ref 39–117)
ALT SERPL-CCNC: 15 IU/L (ref 0–44)
AST SERPL-CCNC: 14 IU/L (ref 0–40)
BILIRUB SERPL-MCNC: 0.7 MG/DL (ref 0–1.2)
BUN SERPL-MCNC: 10 MG/DL (ref 6–24)
BUN/CREAT SERPL: 10 (ref 9–20)
CALCIUM SERPL-MCNC: 9.6 MG/DL (ref 8.7–10.2)
CHLORIDE SERPL-SCNC: 99 MMOL/L (ref 96–106)
CHOLEST SERPL-MCNC: 201 MG/DL (ref 100–199)
CO2 SERPL-SCNC: 24 MMOL/L (ref 18–29)
CREAT SERPL-MCNC: 1.01 MG/DL (ref 0.76–1.27)
CREAT UR-MCNC: 282.9 MG/DL
EST. AVERAGE GLUCOSE BLD GHB EST-MCNC: 126 MG/DL
GLOBULIN SER CALC-MCNC: 2.4 G/DL (ref 1.5–4.5)
GLUCOSE SERPL-MCNC: 141 MG/DL (ref 65–99)
HBA1C MFR BLD: 6 % (ref 4.8–5.6)
HDLC SERPL-MCNC: 72 MG/DL
LDLC SERPL CALC-MCNC: 109 MG/DL (ref 0–99)
MICROALBUMIN UR-MCNC: 13.6 UG/ML
POTASSIUM SERPL-SCNC: 4.1 MMOL/L (ref 3.5–5.2)
PROT SERPL-MCNC: 6.9 G/DL (ref 6–8.5)
SODIUM SERPL-SCNC: 141 MMOL/L (ref 134–144)
T4 FREE SERPL-MCNC: 1.11 NG/DL (ref 0.82–1.77)
TRIGL SERPL-MCNC: 100 MG/DL (ref 0–149)
TSH SERPL DL<=0.005 MIU/L-ACNC: 1.17 UIU/ML (ref 0.45–4.5)
VLDLC SERPL CALC-MCNC: 20 MG/DL (ref 5–40)

## 2017-04-27 RX ORDER — AMLODIPINE AND BENAZEPRIL HYDROCHLORIDE 5; 40 MG/1; MG/1
CAPSULE ORAL
Qty: 90 CAP | Refills: 2 | Status: SHIPPED | OUTPATIENT
Start: 2017-04-27 | End: 2018-01-24 | Stop reason: SDUPTHER

## 2017-04-28 ENCOUNTER — OFFICE VISIT (OUTPATIENT)
Dept: ENDOCRINOLOGY | Age: 57
End: 2017-04-28

## 2017-04-28 VITALS
HEART RATE: 66 BPM | HEIGHT: 68 IN | DIASTOLIC BLOOD PRESSURE: 87 MMHG | SYSTOLIC BLOOD PRESSURE: 124 MMHG | WEIGHT: 190 LBS | BODY MASS INDEX: 28.79 KG/M2

## 2017-04-28 DIAGNOSIS — I10 HTN (HYPERTENSION), BENIGN: ICD-10-CM

## 2017-04-28 DIAGNOSIS — E78.5 DYSLIPIDEMIA: ICD-10-CM

## 2017-04-28 DIAGNOSIS — F41.9 ANXIETY: ICD-10-CM

## 2017-04-28 DIAGNOSIS — E89.0 POSTOPERATIVE HYPOTHYROIDISM: ICD-10-CM

## 2017-04-28 DIAGNOSIS — E11.9 TYPE 2 DIABETES MELLITUS WITHOUT COMPLICATION, WITHOUT LONG-TERM CURRENT USE OF INSULIN (HCC): Primary | ICD-10-CM

## 2017-04-28 NOTE — PATIENT INSTRUCTIONS
Thyroid:  Continue 150 mcg. Labs in 2-3 months as TSH may still be trending upwards    Diabetes:   Improved with weight loss. Watch carbohydrate intake with meals and aim to eat less than 45 grams per meal.  Continue metformin - 1000 mg twice daily     Goals for blood sugars:  Fasting  (less than 150) -Normal is < 100  Other times -  (less than 180). Normal is < 130    Cholesterol:   Improved   Continue rosuvastatin 20 mg.       Continue weight loss efforts    Blood pressure  -at goal  - continue amlodipine/benazepril

## 2017-04-28 NOTE — PROGRESS NOTES
History of Present Illness: Juani Kwan is a 65 y.o. male presents for follow-up of hypothyroidism  He also has diabetes, HTN and dyslipidemia    Of note, since last visit he had ED presentation for anxiety and was found to have iatrogenic hyperthyroidism on the 200 mcg levothryoxine. There have been several phone calls and labs done in the interim. He is scheduled for a cruise in a week, but due to anxiety and thyroid problems does not feel he can go. Has form for insurance to be filled out    Hypothyroid since thyroidectomy was done in summer 2012. Dose has fluctuated considerably. Dose was as high as 300 mcg in 2014 and as low as 150 mcg. Currently taking 150 mcg and TSH is at goal 3 weeks after a dose decrease. Anxiety is some better, but still problematic. Diabetes -  Medications: Taking metformin 1 g BID    Exercise -still exercising. Lipids: rosuvastatin 20 mg - labs improved. HTN - + history of microalbuminuria. - BP controlled   Taking amlodipine/benazepril at night. Social:  . Wife is helping with diet        Past Medical History:   Diagnosis Date    Diabetes mellitus type II     Dyslipidemia     Goiter     Hypertension     Hypothyroidism     Psychiatric disorder     aniexty     Current Outpatient Prescriptions   Medication Sig    amLODIPine-benazepril (LOTREL) 5-40 mg per capsule TAKE 1 CAPSULE NIGHTLY FOR BLOOD PRESSURE    mirtazapine (REMERON) 7.5 mg tablet Take 1 Tab by mouth nightly.  levothyroxine (SYNTHROID) 175 mcg tablet One tablet most days and 1/2 tablet on Sundays for 6.5 tablets/week (162.5 mcg/day average daily dose) (Patient taking differently: One tablet most days and 1/2 tablet on Sundays and Wednesdays for 6 tablets/week)    metFORMIN (GLUCOPHAGE) 1,000 mg tablet TAKE 1 TABLET TWICE A DAY WITH MEALS    busPIRone (BUSPAR) 15 mg tablet Take 1 Tab by mouth two (2) times a day.     ALPRAZolam (XANAX) 1 mg tablet Take 1 Tab by mouth daily as needed for Anxiety.  rosuvastatin (CRESTOR) 20 mg tablet Take 1 Tab by mouth nightly. For cholesterol. Replaces atorvastatin     No current facility-administered medications for this visit. No Known Allergies    Review of Systems:  - Eyes: no blurry vision or double vision  - Cardiovascular: no chest pain  - Respiratory: no shortness of breath  - Musculoskeletal: no myalgias  - Neurological: no numbness/tingling in extremities    Physical Examination:  Visit Vitals    /87    Pulse 66    Ht 5' 8\" (1.727 m)    Wt 190 lb (86.2 kg)    BMI 28.89 kg/m2   -   - General: pleasant, no distress, normal gait   HEENT: hearing intact, EOMI, clear sclera without icterus  - Cardiovascular: regular, normal rate   - Respiratory: normal effort  - Integumentary: no edema  - Psychiatric: normal mood and affect    Data Reviewed:   Component      Latest Ref Rng & Units 4/21/2017 4/21/2017 4/21/2017 4/21/2017           7:59 AM  7:59 AM  7:59 AM  7:59 AM   Glucose      65 - 99 mg/dL       BUN      6 - 24 mg/dL       Creatinine      0.76 - 1.27 mg/dL       GFR est non-AA      >59 mL/min/1.73       GFR est AA      >59 mL/min/1.73       BUN/Creatinine ratio      9 - 20       Sodium      134 - 144 mmol/L       Potassium      3.5 - 5.2 mmol/L       Chloride      96 - 106 mmol/L       CO2      18 - 29 mmol/L       Calcium      8.7 - 10.2 mg/dL       Protein, total      6.0 - 8.5 g/dL       Albumin      3.5 - 5.5 g/dL       GLOBULIN, TOTAL      1.5 - 4.5 g/dL       A-G Ratio      1.2 - 2.2       Bilirubin, total      0.0 - 1.2 mg/dL       Alk.  phosphatase      39 - 117 IU/L       AST      0 - 40 IU/L       ALT      0 - 44 IU/L       Cholesterol, total      100 - 199 mg/dL   201 (H)    Triglyceride      0 - 149 mg/dL   100    HDL Cholesterol      >39 mg/dL   72    VLDL, calculated      5 - 40 mg/dL   20    LDL, calculated      0 - 99 mg/dL   109 (H)    Creatinine, urine      Not Estab. mg/dL       Microalbumin, urine      Not Estab. ug/mL Microalbumin/Creat. Ratio      0.0 - 30.0 mg/g creat       Hemoglobin A1c, (calculated)      4.8 - 5.6 %    6.0 (H)   Estimated average glucose      mg/dL    126   TSH      0.450 - 4.500 uIU/mL  1.170     T4, Free      0.82 - 1.77 ng/dL 1.11        Component      Latest Ref Rng & Units 4/21/2017 4/21/2017           7:59 AM  7:59 AM   Glucose      65 - 99 mg/dL  141 (H)   BUN      6 - 24 mg/dL  10   Creatinine      0.76 - 1.27 mg/dL  1.01   GFR est non-AA      >59 mL/min/1.73  83   GFR est AA      >59 mL/min/1.73  96   BUN/Creatinine ratio      9 - 20  10   Sodium      134 - 144 mmol/L  141   Potassium      3.5 - 5.2 mmol/L  4.1   Chloride      96 - 106 mmol/L  99   CO2      18 - 29 mmol/L  24   Calcium      8.7 - 10.2 mg/dL  9.6   Protein, total      6.0 - 8.5 g/dL  6.9   Albumin      3.5 - 5.5 g/dL  4.5   GLOBULIN, TOTAL      1.5 - 4.5 g/dL  2.4   A-G Ratio      1.2 - 2.2  1.9   Bilirubin, total      0.0 - 1.2 mg/dL  0.7   Alk. phosphatase      39 - 117 IU/L  60   AST      0 - 40 IU/L  14   ALT      0 - 44 IU/L  15   Cholesterol, total      100 - 199 mg/dL     Triglyceride      0 - 149 mg/dL     HDL Cholesterol      >39 mg/dL     VLDL, calculated      5 - 40 mg/dL     LDL, calculated      0 - 99 mg/dL     Creatinine, urine      Not Estab. mg/dL 282.9    Microalbumin, urine      Not Estab. ug/mL 13.6    Microalbumin/Creat. Ratio      0.0 - 30.0 mg/g creat 4.8        Assessment/Plan:   2 Type 2 diabetes mellitus without complication, without long-term current use of insulin ([address]   - controlled. Continue metformin. Wt loss has helped   1. Postoperative hypothyroidism   - had iatrogenic hyperthyroidism with sx for several months. - thyroid labs now normalized, but sx may take a few weeks to subside  - continue 150 mcg. Labs in 2-3 months  - filled out form and wrote letter conveying how iatrogenic hyperthyroidism and related sx(anxiety most significantly)  do not put in good state to proceed with cruise   3. Dyslipidemia - improved. Continue rosuvastatin   4. HTN (hypertension), benign - controlled. Continue amlodipine/benazpril   5. Anxiety - per Ash Mejía NP. Exacerbated by hyperthyroidism     Patient Instructions   Thyroid:  Continue 150 mcg. Labs in 2-3 months as TSH may still be trending upwards    Diabetes:   Improved with weight loss. Watch carbohydrate intake with meals and aim to eat less than 45 grams per meal.  Continue metformin - 1000 mg twice daily     Goals for blood sugars:  Fasting  (less than 150) -Normal is < 100  Other times -  (less than 180). Normal is < 130    Cholesterol:   Improved   Continue rosuvastatin 20 mg. Continue weight loss efforts    Blood pressure  -at goal  - continue amlodipine/benazepril    Follow-up Disposition:  Return in about 3 months (around 7/28/2017).     Copy sent to:

## 2017-04-28 NOTE — LETTER
4/28/2017 2:26 PM 
 
Mr. Juani Kwan [address] [id number] To Whom it may concern, 
 
Mr Tony Novoa follows with me for hypothyroidism and is treated for with levothyroxine. In fall 2016 he was hypothyroid on levothyroxine dose. Dose was increased to 200 mcg daily. In March 2017 he started having severe problems with anxiety and went to emergency department on 3/10/2017. He was hyperthyroid (iatrogenic hyperthyroidism) on the 200 mcg tablet and dose has been progressively decreased over last few months to current dose of 150 mcg. Please see attached labs to document he has been following closely and we have made medication changes over the last few months. Addition, he is seeing mental health team Sue Drew NP) for anxiety and has seen her frequently over the last month. He takes several medications for anxiety. The hyperthyroidism exacerbated his anxiety and really made it hard for him to function.   
 
 
Sincerely, 
 
 
Karla Murrieta MD

## 2017-04-28 NOTE — MR AVS SNAPSHOT
Visit Information Date & Time Provider Department Dept. Phone Encounter #  
 4/28/2017  1:30 PM Wesley Cast, [address] [place] [address] Diabetes and Endocrinology [id number] Follow-up Instructions Return in about 3 months (around 7/28/2017). Your Appointments 5/5/2017 11:30 AM  
ESTABLISHED PATIENT with Brendolyn Stage, NP [facility] ([facility]) Appt Note: 1 month follow-up [address] [facility] [address] [place] [address] [facility] [address]  
  
   
 [address] [facility] [address] [facility] [address] [facility] [address] Upcoming Health Maintenance Date Due Hepatitis C Screening 1960 Pneumococcal 19-64 Medium Risk (1 of 1 - PPSV23) 3/27/1980 DTaP/Tdap/Td series (1 - Tdap) 3/27/1982 FOBT Q 1 YEAR AGE 50-75 3/27/2011 INFLUENZA AGE 9 TO ADULT 8/1/2016 EYE EXAM RETINAL OR DILATED Q1 8/27/2016 FOOT EXAM Q1 2/22/2017 HEMOGLOBIN A1C Q6M 10/21/2017 MICROALBUMIN Q1 4/21/2018 LIPID PANEL Q1 4/21/2018 Allergies as of 4/28/2017  Review Complete On: 4/28/2017 By: Wesley Cast MD  
 No Known Allergies Current Immunizations  Never Reviewed No immunizations on file. Not reviewed this visit You Were Diagnosed With   
  
 Codes Comments Type 2 diabetes mellitus without complication, without long-term current use of insulin (HCC)    -  Primary ICD-10-CM: E11.9 ICD-9-CM: 250.00 Postoperative hypothyroidism     ICD-10-CM: E89.0 ICD-9-CM: 244.0 Dyslipidemia     ICD-10-CM: E78.5 ICD-9-CM: 272.4 HTN (hypertension), benign     ICD-10-CM: I10 
ICD-9-CM: 401.1 Anxiety     ICD-10-CM: F41.9 ICD-9-CM: 300.00 Vitals BP Pulse Height(growth percentile) Weight(growth percentile) BMI Smoking Status 124/87 66 5' 8\" (1.727 m) 190 lb (86.2 kg) 28.89 kg/m2 Former Smoker Vitals History BMI and BSA Data Body Mass Index Body Surface Area  
 28.89 kg/m 2 2.03 m 2 Preferred Pharmacy Pharmacy Name Phone [address] [facility] [phone] Your Updated Medication List  
  
   
This list is accurate as of: 4/28/17  2:35 PM.  Always use your most recent med list.  
  
  
  
  
 ALPRAZolam 1 mg tablet Commonly known as:  Niño Poke Take 1 Tab by mouth daily as needed for Anxiety. amLODIPine-benazepril 5-40 mg per capsule Commonly known as:  LOTREL  
TAKE 1 CAPSULE NIGHTLY FOR BLOOD PRESSURE  
  
 levothyroxine 175 mcg tablet Commonly known as:  SYNTHROID One tablet most days and 1/2 tablet on Sundays for 6.5 tablets/week (162.5 mcg/day average daily dose) metFORMIN 1,000 mg tablet Commonly known as:  GLUCOPHAGE  
TAKE 1 TABLET TWICE A DAY WITH MEALS  
  
 mirtazapine 7.5 mg tablet Commonly known as:  Maria L Lakeshia Take 1 Tab by mouth nightly. rosuvastatin 20 mg tablet Commonly known as:  CRESTOR Take 1 Tab by mouth nightly. For cholesterol. Replaces atorvastatin We Performed the Following T4, FREE [id number] CPT(R)] TSH 3RD GENERATION [59124 CPT(R)] Follow-up Instructions Return in about 3 months (around 7/28/2017). Patient Instructions Thyroid: 
Continue 150 mcg. Labs in 2-3 months as TSH may still be trending upwards Diabetes:  
Improved with weight loss. Watch carbohydrate intake with meals and aim to eat less than 45 grams per meal. 
Continue metformin - 1000 mg twice daily Goals for blood sugars: 
Fasting  (less than 150) -Normal is < 100 Other times -  (less than 180). Normal is < 130 Cholesterol:  
Improved Continue rosuvastatin 20 mg. Continue weight loss efforts Blood pressure 
-at goal 
- continue amlodipine/benazepril Introducing [facility]! [employer] introduces [employer] patient portal. Now you can access parts of your medical record, email your doctor's office, and request medication refills online. 1. In your internet browser, go to [email or web address] 2. Click on the First Time User? Click Here link in the Sign In box. You will see the New Member Sign Up page. 3. Enter your [employer] Access Code exactly as it appears below. You will not need to use this code after youve completed the sign-up process. If you do not sign up before the expiration date, you must request a new code. · [employer] Access Code: [email or web address] Expires: 4/30/2017 10:45 AM 
 
4. Enter the last four digits of your Social Security Number (xxxx) and Date of Birth (mm/dd/yyyy) as indicated and click Submit. You will be taken to the next sign-up page. 5. Create a [employer] ID. This will be your [employer] login ID and cannot be changed, so think of one that is secure and easy to remember. 6. Create a [employer] password. You can change your password at any time. 7. Enter your Password Reset Question and Answer. This can be used at a later time if you forget your password. 8. Enter your e-mail address. You will receive e-mail notification when new information is available in [address] 9. Click Sign Up. You can now view and download portions of your medical record. 10. Click the Download Summary menu link to download a portable copy of your medical information. If you have questions, please visit the Frequently Asked Questions section of the [employer] website. Remember, [employer] is NOT to be used for urgent needs. For medical emergencies, dial 911. Now available from your iPhone and Android! Please provide this summary of care documentation to your next provider. Your primary care clinician is listed as Manuel Palacios If you have any questions after today's visit, please call [phone].

## 2017-05-05 ENCOUNTER — OFFICE VISIT (OUTPATIENT)
Dept: BEHAVIORAL/MENTAL HEALTH CLINIC | Age: 57
End: 2017-05-05

## 2017-05-05 VITALS
HEART RATE: 70 BPM | BODY MASS INDEX: 28.95 KG/M2 | OXYGEN SATURATION: 99 % | DIASTOLIC BLOOD PRESSURE: 93 MMHG | HEIGHT: 68 IN | WEIGHT: 191 LBS | SYSTOLIC BLOOD PRESSURE: 157 MMHG

## 2017-05-05 DIAGNOSIS — F32.1 MODERATE SINGLE CURRENT EPISODE OF MAJOR DEPRESSIVE DISORDER (HCC): Primary | ICD-10-CM

## 2017-05-05 RX ORDER — ALPRAZOLAM 0.5 MG/1
0.5 TABLET ORAL
Qty: 90 TAB | Refills: 1 | Status: SHIPPED | OUTPATIENT
Start: 2017-05-05 | End: 2017-06-08 | Stop reason: SDUPTHER

## 2017-05-05 RX ORDER — ESCITALOPRAM OXALATE 10 MG/1
TABLET ORAL
Qty: 30 TAB | Refills: 1 | Status: SHIPPED | OUTPATIENT
Start: 2017-05-05 | End: 2017-05-24 | Stop reason: SINTOL

## 2017-05-05 NOTE — PROGRESS NOTES
CHIEF COMPLAINT:  Iesha Jose is a 65 y.o. male and was seen today for follow-up of psychiatric condition and psychotropic medication management. HPI:     Iesha Jose is a 65 y.o. yo male who presents with symptoms of depression and anxiety. His PMH is significant for DM type 2, dyslipidemia, and hypothyroidism secondary to thyroidectomy in 2012. He has no history of inpt or outpt psychiatric care. He denies a history substance abuse or physical/ sexual/ emotional abuse. He lost a daughter secondary to SIDS and another daughter in an MVC when she was 19yo. He recently lost a cousin and a close friend who passed from a self-inflicted GSW in Nov 0835. Mr. Nabila Amezcua reports onset of depression and anxiety with a panic attack occurring late Nov- early Dec 2106. Depressive symptoms include sad moods, poor motivation and energy, poor appetite, anhedonia (shopping, yard work, eating out), hypersomnolence, nervousness, hopeless and helpless feelings, and generalized anxiety. He doubts himself and is not as confident in his decisions. 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 He also reports a happy and stable home life with his wife of 11 years and their children. Mr. Nabila Amezcua worries about small things that occur at the job; issues that he has been adept and confident at handling previously. He notes urges to isolate and spends a large amount of his time at home asleep. His behavior has been noted by his family who are concerned about him. He has several guns that he keeps in a safe, but denies SI. His PCP prescribed Xanax in Dec 2016 and Mr. Nabila Amezcua thinks that this has been helpful for his anxiety. He uses it sparingly. He denies psychosis, no symptoms of margoth/ hypomania reported. He sees Dr. Daniel Meyers for management of thyroid issues. FAMILY/SOCIAL HX:  x 11 years to his 2nd wife. He has 2 sons and an adult daughter. 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, [employer]     REVIEW OF SYSTEMS:  Psychiatric:  depression, anxiety  Appetite:improved and weight increased by 6 lbs. Sleep: good   Neuro: none reported    Visit Vitals    BP (!) 157/93 (BP 1 Location: Left arm, BP Patient Position: Sitting)    Pulse 70    Ht 5' 8\" (1.727 m)    Wt 86.6 kg (191 lb)    SpO2 99%    BMI 29.04 kg/m2       Side Effects:  none    MENTAL STATUS EXAM:   Sensorium  oriented to time, place and person   Relations cooperative   Appearance:  age appropriate and casually dressed   Motor Behavior:  gait stable and within normal limits   Speech:  normal pitch, normal volume and non-pressured   Thought Process: goal directed and logical   Thought Content free of delusions, free of hallucinations and not internally preoccupied    Suicidal ideations none   Homicidal ideations none   Mood:  euthymic   Affect:  euthymic   Memory recent  adequate   Memory remote:  adequate   Concentration:  adequate   Abstraction:  abstract   Insight:  good   Reliability good   Judgment:  good     MEDICAL DECISION MAKING:  Problems addressed today:    ICD-10-CM ICD-9-CM    1. Moderate single current episode of major depressive disorder ([facility] F32.1 296.22        Assessment:   Lucho Guan is not responding to treatment, symptoms are continued depression. He reports that the Remeron and Buspar both made him extremely tired. He is overwhelmed with work stressors and his teenage son is having behavioral problems at school. He and his wife cancelled their cruise to work pressures, and this has upset his wife. She is staying with her sister for the rest of the week. TSH/T4 have normalized. Sleep is good with Xanax and improved sleep hygiene practices. Lucho Guan reports sometimes taking Xanax 0.5mg every day PRN severe anxiety, and he says that this benefits him greatly. He feels blunted and depressed- \"not where I want to be. \"        Current Outpatient Prescriptions   Medication Sig Dispense Refill    ALPRAZolam (XANAX) 0.5 mg tablet Take 1 Tab by mouth three (3) times daily as needed for Anxiety. Max Daily Amount: 1.5 mg. 90 Tab 1    escitalopram oxalate (LEXAPRO) 10 mg tablet Take 1/2 tablet by mouth daily x 2 weeks, then increase dose to 1 whole tablet by mouth daily. 30 Tab 1    amLODIPine-benazepril (LOTREL) 5-40 mg per capsule TAKE 1 CAPSULE NIGHTLY FOR BLOOD PRESSURE 90 Cap 2    levothyroxine (SYNTHROID) 175 mcg tablet One tablet most days and 1/2 tablet on Sundays for 6.5 tablets/week (162.5 mcg/day average daily dose) (Patient taking differently: One tablet most days and 1/2 tablet on Sundays and Wednesdays for 6 tablets/week) 90 Tab 3    metFORMIN (GLUCOPHAGE) 1,000 mg tablet TAKE 1 TABLET TWICE A DAY WITH MEALS 180 Tab 3    rosuvastatin (CRESTOR) 20 mg tablet Take 1 Tab by mouth nightly. For cholesterol. Replaces atorvastatin 90 Tab 3    mirtazapine (REMERON) 7.5 mg tablet Take 1 Tab by mouth nightly. 30 Tab 0       Plan:   1. Medications/ Labs: Retrial Lexapro. Previous provider had put him on 20mg straight away and Mr. Amanda Schneider took 1 dose and experienced intolerable SEs. Discussed with him that dose will be 5mg every day x 2 weeks, then increased to 10mg every day for anxiety and depression. Increase Xanax to 0.5mg tid PRN anxiety. Rxs provided. 2.  Counseling and coordination of care including instructions for treatment, risks/benefits, risk factor reduction and patient/family education. He agrees with the plan. Patient instructed to call with any side effects, questions or issues.      3.  Follow-up Disposition:  Return in about 1 month (around 6/5/2017).    5/5/2017  Kita Ibrahim NP

## 2017-05-23 ENCOUNTER — TELEPHONE (OUTPATIENT)
Dept: BEHAVIORAL/MENTAL HEALTH CLINIC | Age: 57
End: 2017-05-23

## 2017-05-23 NOTE — TELEPHONE ENCOUNTER
Pt's wife called and asked if you could call  today; she wasa informed that you were not in yesterday and that you will get back to him when you got in and had a moment.

## 2017-05-24 ENCOUNTER — DOCUMENTATION ONLY (OUTPATIENT)
Dept: BEHAVIORAL/MENTAL HEALTH CLINIC | Age: 57
End: 2017-05-24

## 2017-05-24 RX ORDER — VENLAFAXINE HYDROCHLORIDE 75 MG/1
75 CAPSULE, EXTENDED RELEASE ORAL DAILY
Qty: 30 CAP | Refills: 0 | OUTPATIENT
Start: 2017-05-24 | End: 2017-06-08 | Stop reason: SINTOL

## 2017-05-24 NOTE — PROGRESS NOTES
Returned patient's call and spoke with him at length. He is still struggling with depression, anxiety has improved and is stable. No SI/HI. He could not tolerate the Lexapro- caused him to feel \"nervous, stomach upset, crazy thoughts,\" so he stopped taking it. Have trialed him on numerous medications to treat both anxiety and depression (Remeron, Prozac, Buspar, Hydroxyzine, and now Lexapro), but he has been unable to tolerate all of them, even at low doses, for more than a few days or weeks. Will trial him on an SNRI. Sent rx in for Effexor XR 75mg qam #30, no refills, to his pharmacy. He has a f/u appt early June. He has not been back in for therapy and he was encouraged to make another appt for this.

## 2017-05-26 ENCOUNTER — TELEPHONE (OUTPATIENT)
Dept: BEHAVIORAL/MENTAL HEALTH CLINIC | Age: 57
End: 2017-05-26

## 2017-05-26 NOTE — TELEPHONE ENCOUNTER
Pt's wife called and said that the script that was supposed to be called in was not and she wanted to know when it was ready and if you could give Bethany Niño a call to let him know.

## 2017-05-26 NOTE — TELEPHONE ENCOUNTER
Sent rx in electronically on 5/24. Called pharmacy to check but they have no record of it on file-? Checked that pharmacy is correct, and it is. Spoke with pharmacist at [facility] and gave verbal rx: Effexor XR 75mg qam #30 with 1 refill. Patient called and informed.

## 2017-06-08 ENCOUNTER — OFFICE VISIT (OUTPATIENT)
Dept: BEHAVIORAL/MENTAL HEALTH CLINIC | Age: 57
End: 2017-06-08

## 2017-06-08 VITALS
OXYGEN SATURATION: 97 % | SYSTOLIC BLOOD PRESSURE: 127 MMHG | HEIGHT: 68 IN | WEIGHT: 188 LBS | DIASTOLIC BLOOD PRESSURE: 86 MMHG | HEART RATE: 67 BPM | BODY MASS INDEX: 28.49 KG/M2

## 2017-06-08 DIAGNOSIS — F32.1 MODERATE SINGLE CURRENT EPISODE OF MAJOR DEPRESSIVE DISORDER (HCC): Primary | ICD-10-CM

## 2017-06-08 DIAGNOSIS — Z86.59 HISTORY OF PANIC ATTACKS: ICD-10-CM

## 2017-06-08 RX ORDER — BUPROPION HYDROCHLORIDE 75 MG/1
75 TABLET ORAL DAILY
Qty: 30 TAB | Refills: 0 | Status: SHIPPED | OUTPATIENT
Start: 2017-06-08 | End: 2017-07-10 | Stop reason: SDUPTHER

## 2017-06-08 RX ORDER — ALPRAZOLAM 0.5 MG/1
0.5 TABLET ORAL
Qty: 90 TAB | Refills: 1 | Status: SHIPPED | OUTPATIENT
Start: 2017-06-08 | End: 2017-07-10 | Stop reason: SDUPTHER

## 2017-06-08 NOTE — PROGRESS NOTES
CHIEF COMPLAINT:  Sreekanth Munoz is a 65 y.o. male and was seen today for follow-up of psychiatric condition and psychotropic medication management. He attends the session with his wife. HPI:    Sreekanth Munoz is a 65 y.o. yo male who presents with symptoms of depression and anxiety. His PMH is significant for DM type 2, dyslipidemia, and hypothyroidism secondary to thyroidectomy in 2012. He has no history of inpt or outpt psychiatric care. He denies a history substance abuse or physical/ sexual/ emotional abuse. He lost a daughter secondary to SIDS and another daughter in an MVC when she was 18yo. He recently lost a cousin and a close friend who passed from a self-inflicted GSW in Nov 4340. Mr. Kristy Severino reports onset of depression and anxiety with a panic attack occurring late Nov- early Dec 2106. Depressive symptoms include sad moods, poor motivation and energy, poor appetite, anhedonia (shopping, yard work, eating out), hypersomnolence, nervousness, hopeless and helpless feelings, and generalized anxiety. He doubts himself and is not as confident in his decisions. 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 He also reports a happy and stable home life with his wife of 11 years and their children. Mr. Kristy Severino worries about small things that occur at the job; issues that he has been adept and confident at handling previously. He notes urges to isolate and spends a large amount of his time at home asleep. His behavior has been noted by his family who are concerned about him. He has several guns that he keeps in a safe, but denies SI. His PCP prescribed Xanax in Dec 2016 and Mr. Kristy Severino thinks that this has been helpful for his anxiety. He uses it sparingly. He denies psychosis, no symptoms of margoth/ hypomania reported. He sees Dr. Carlos Knox for management of thyroid issues. FAMILY/SOCIAL HX:  x 11 years to his 2nd wife. He has 2 sons and an adult daughter. 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 Sleep: no change   Neuro: none reported     Visit Vitals    /86 (BP 1 Location: Left arm, BP Patient Position: Sitting)    Pulse 67    Ht 5' 8\" (1.727 m)    Wt 85.3 kg (188 lb)    SpO2 97%    BMI 28.59 kg/m2       Side Effects:  none    MENTAL STATUS EXAM:   Sensorium  oriented to time, place and person   Relations cooperative   Appearance:  age appropriate and casually dressed   Motor Behavior:  gait stable and within normal limits   Speech:  normal pitch, normal volume and non-pressured   Thought Process: goal directed and logical   Thought Content free of delusions, free of hallucinations and not internally preoccupied    Suicidal ideations none   Homicidal ideations none   Mood:  euthymic   Affect:  euthymic   Memory recent  adequate   Memory remote:  adequate   Concentration:  adequate   Abstraction:  abstract   Insight:  good   Reliability good   Judgment:  good     MEDICAL DECISION MAKING:  Problems addressed today:    ICD-10-CM ICD-9-CM    1. Moderate single current episode of major depressive disorder (HCC) F32.1 296.22    2. History of panic attacks Z86.59 V11.8        Assessment:   Maximo Chambers is not responding to treatment, symptoms are continued depression. He was not able to tolerate the Effexor. It made him feel jittery and nervous and caused GI upset. He stopped taking it after 3 days. So far he has trialed: Remeron, Prozac, Buspar, Hydroxyzine, Lexapro, and now Effexor, but he has been unable to tolerate all of them, even at low doses, for more than a few days or weeks. Xanax is still helpful for sleep and for daytime anxiety. Work is in flux and he reports racing thoughts, new inability to finish tasks, isolating behaviors, amotivation, labile appetite, and anhedonia. He has not been back into therapy. Current Outpatient Prescriptions   Medication Sig Dispense Refill    buPROPion (WELLBUTRIN) 75 mg tablet Take 1 Tab by mouth daily. 30 Tab 0    ALPRAZolam (XANAX) 0.5 mg tablet Take 1 Tab by mouth three (3) times daily as needed for Anxiety. Max Daily Amount: 1.5 mg. 90 Tab 1    amLODIPine-benazepril (LOTREL) 5-40 mg per capsule TAKE 1 CAPSULE NIGHTLY FOR BLOOD PRESSURE 90 Cap 2    levothyroxine (SYNTHROID) 175 mcg tablet One tablet most days and 1/2 tablet on Sundays for 6.5 tablets/week (162.5 mcg/day average daily dose) (Patient taking differently: One tablet most days and 1/2 tablet on Sundays and Wednesdays for 6 tablets/week) 90 Tab 3    metFORMIN (GLUCOPHAGE) 1,000 mg tablet TAKE 1 TABLET TWICE A DAY WITH MEALS 180 Tab 3    rosuvastatin (CRESTOR) 20 mg tablet Take 1 Tab by mouth nightly. For cholesterol. Replaces atorvastatin 90 Tab 3       Plan:   1. Medications/ Labs: D/C Effexor and start Wellbutrin 75mg qam for depression and anxiety. Rx sent to his pharmacy. Continue Xanax 0.5mg tid for anxiety. Sent given to patient. Therapy again encouraged. 2.  Counseling and coordination of care including instructions for treatment, risks/benefits, risk factor reduction and patient/family education. He agrees with the plan. Patient instructed to call with any side effects, questions or issues.      3.  Follow-up Disposition:  Return in about 1 month (around 7/8/2017).    6/8/2017  Arie Moran NP

## 2017-07-10 ENCOUNTER — OFFICE VISIT (OUTPATIENT)
Dept: BEHAVIORAL/MENTAL HEALTH CLINIC | Age: 57
End: 2017-07-10

## 2017-07-10 VITALS
OXYGEN SATURATION: 98 % | BODY MASS INDEX: 29.1 KG/M2 | DIASTOLIC BLOOD PRESSURE: 82 MMHG | WEIGHT: 192 LBS | HEIGHT: 68 IN | HEART RATE: 74 BPM | SYSTOLIC BLOOD PRESSURE: 120 MMHG

## 2017-07-10 DIAGNOSIS — F32.1 MODERATE SINGLE CURRENT EPISODE OF MAJOR DEPRESSIVE DISORDER (HCC): Primary | ICD-10-CM

## 2017-07-10 DIAGNOSIS — Z86.59 HISTORY OF PANIC ATTACKS: ICD-10-CM

## 2017-07-10 RX ORDER — BUPROPION HYDROCHLORIDE 100 MG/1
100 TABLET ORAL DAILY
Qty: 30 TAB | Refills: 1 | Status: SHIPPED | OUTPATIENT
Start: 2017-07-10 | End: 2017-08-14 | Stop reason: SDUPTHER

## 2017-07-10 RX ORDER — ALPRAZOLAM 0.5 MG/1
0.5 TABLET ORAL
Qty: 90 TAB | Refills: 0 | Status: SHIPPED | OUTPATIENT
Start: 2017-07-10 | End: 2017-08-14 | Stop reason: SDUPTHER

## 2017-07-10 RX ORDER — TRAZODONE HYDROCHLORIDE 50 MG/1
TABLET ORAL
Qty: 30 TAB | Refills: 0 | Status: SHIPPED | OUTPATIENT
Start: 2017-07-10 | End: 2017-08-14

## 2017-07-10 NOTE — PROGRESS NOTES
CHIEF COMPLAINT:  Vikki Price is a 65 y.o. male and was seen today for follow-up of psychiatric condition and psychotropic medication management. HPI:     Vikki Price is a 65 y.o. yo male who presents with symptoms of depression and anxiety. His PMH is significant for DM type 2, dyslipidemia, and hypothyroidism secondary to thyroidectomy in 2012. He has no history of inpt or outpt psychiatric care. He denies a history substance abuse or physical/ sexual/ emotional abuse. He lost a daughter secondary to SIDS and another daughter in an MVC when she was 19yo. He recently lost a cousin and a close friend who passed from a self-inflicted GSW in Nov 1600. Mr. Solange Corea reports onset of depression and anxiety with a panic attack occurring late Nov- early Dec 2106. Depressive symptoms include sad moods, poor motivation and energy, poor appetite, anhedonia (shopping, yard work, eating out), hypersomnolence, isolating behaviors, nervousness, hopeless and helpless feelings, and generalized anxiety. He doubts himself and is not as confident in his decisions. His family, who is supportive, have been concerned about his depression and anxiety. 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 He sees Dr. Shanell Kitchen for management of thyroid issues. FAMILY/SOCIAL HX:  x 11 years to his 2nd wife. He has 2 sons and an adult daughter. 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    Sleep: poor with DIMS (difficulty initiating & maintaining sleep)   Neuro: none reported    Visit Vitals    /82 (BP 1 Location: Left arm, BP Patient Position: Sitting)    Pulse 74    Ht 5' 8\" (1.727 m)    Wt 87.1 kg (192 lb)    SpO2 98%    BMI 29.19 kg/m2       Side Effects:  mild insomnia    MENTAL STATUS EXAM:   Sensorium  oriented to time, place and person   Relations cooperative   Appearance:  age appropriate and casually dressed   Motor Behavior:  gait stable and within normal limits   Speech:  normal pitch, normal volume and non-pressured   Thought Process: goal directed and logical   Thought Content free of delusions, free of hallucinations and not internally preoccupied    Suicidal ideations none   Homicidal ideations none   Mood:  euthymic   Affect:  euthymic   Memory recent  adequate   Memory remote:  adequate   Concentration:  adequate   Abstraction:  concrete   Insight:  good   Reliability good   Judgment:  good     MEDICAL DECISION MAKING:  Problems addressed today:    ICD-10-CM ICD-9-CM    1. Moderate single current episode of major depressive disorder (HCC) F32.1 296.22    2. History of panic attacks Z86.59 V11.8        Assessment:   Lawson David is responding to treatment, symptoms are improved. He is tolerating the Wellbutrin without issue. 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 He has a  and will start the paperwork soon. He is having more good days than bad days. He has noted some insomnia and is taking the Xanax for this. He is inquiring about sleep aids. He is considering not working for a few months, starting a PT job, or flipping houses. Motivation is still low but is improving. He recently went to a family reunion and had a good time. Current Outpatient Prescriptions   Medication Sig Dispense Refill    buPROPion (WELLBUTRIN) 100 mg tablet Take 1 Tab by mouth daily. 30 Tab 1    ALPRAZolam (XANAX) 0.5 mg tablet Take 1 Tab by mouth three (3) times daily as needed for Anxiety. Max Daily Amount: 1.5 mg. 90 Tab 0    traZODone (DESYREL) 50 mg tablet Take 1/2- 1 whole tablet at bedtime as needed for insomnia.  30 Tab 0    amLODIPine-benazepril (LOTREL) 5-40 mg per capsule TAKE 1 CAPSULE NIGHTLY FOR BLOOD PRESSURE 90 Cap 2    levothyroxine (SYNTHROID) 175 mcg tablet One tablet most days and 1/2 tablet on Sundays for 6.5 tablets/week (162.5 mcg/day average daily dose) (Patient taking differently: One tablet most days and 1/2 tablet on Sundays and Wednesdays for 6 tablets/week) 90 Tab 3    metFORMIN (GLUCOPHAGE) 1,000 mg tablet TAKE 1 TABLET TWICE A DAY WITH MEALS 180 Tab 3    rosuvastatin (CRESTOR) 20 mg tablet Take 1 Tab by mouth nightly. For cholesterol. Replaces atorvastatin 90 Tab 3       Plan:   1. Medications/ Labs: Increase Wellbutrin from 75mg to 100mg qam for depression and anxiety. Start Trazodone 25-50mg qhs PRN insomnia. Rxs sent to his pharmacy. Continue Xanax 0.5mg tid PRN anxiety. Rx handed to patient. 2.  Counseling and coordination of care including instructions for treatment, risks/benefits, risk factor reduction and patient/family education. He agrees with the plan. Patient instructed to call with any side effects, questions or issues. 3.  Follow-up Disposition:  Return in about 1 month (around 8/10/2017).     7/10/2017  Keri Anthony NP

## 2017-07-27 LAB
T4 FREE SERPL-MCNC: 1.79 NG/DL (ref 0.82–1.77)
TSH SERPL DL<=0.005 MIU/L-ACNC: 1.31 UIU/ML (ref 0.45–4.5)

## 2017-07-28 ENCOUNTER — OFFICE VISIT (OUTPATIENT)
Dept: ENDOCRINOLOGY | Age: 57
End: 2017-07-28

## 2017-07-28 VITALS
SYSTOLIC BLOOD PRESSURE: 120 MMHG | BODY MASS INDEX: 29.4 KG/M2 | HEART RATE: 69 BPM | HEIGHT: 68 IN | DIASTOLIC BLOOD PRESSURE: 83 MMHG | WEIGHT: 194 LBS

## 2017-07-28 DIAGNOSIS — E11.9 TYPE 2 DIABETES MELLITUS WITHOUT COMPLICATION, WITHOUT LONG-TERM CURRENT USE OF INSULIN (HCC): ICD-10-CM

## 2017-07-28 DIAGNOSIS — I10 HTN (HYPERTENSION), BENIGN: ICD-10-CM

## 2017-07-28 DIAGNOSIS — E78.5 DYSLIPIDEMIA: ICD-10-CM

## 2017-07-28 DIAGNOSIS — E89.0 POSTOPERATIVE HYPOTHYROIDISM: Primary | ICD-10-CM

## 2017-07-28 RX ORDER — LEVOTHYROXINE SODIUM 150 UG/1
150 TABLET ORAL
Qty: 90 TAB | Refills: 3 | Status: SHIPPED | OUTPATIENT
Start: 2017-07-28 | End: 2018-07-31 | Stop reason: SDUPTHER

## 2017-07-28 RX ORDER — LEVOTHYROXINE SODIUM 150 UG/1
150 TABLET ORAL
Qty: 90 TAB | Refills: 3 | Status: SHIPPED | OUTPATIENT
Start: 2017-07-28 | End: 2017-07-28 | Stop reason: SDUPTHER

## 2017-07-28 NOTE — PROGRESS NOTES
History of Present Illness: Kristine Isidro is a 65 y.o. male presents for follow-up of hypothyroidism and diabetes. He also has HTN and dyslipidemia    Hypothyroid since thyroidectomy was done in summer 2012. Dose has fluctuated considerably. Dose was as high as 300 mcg in 2014 and as low as 150 mcg. Currently taking 150 mcg and TSH is at goal. (taking 175 mcg most days and 1/2 tablet twice weekly for 150 mcg/day on avg)   TSH at goal.    He has problems with depression and anxiety. He is following with behavioral health. Diabetes -  Medications: Taking metformin 1 g BID    Exercise -still exercising. Lipids: rosuvastatin 20 mg - labs improved. HTN - + history of microalbuminuria. - BP controlled   Taking amlodipine/benazepril at night. Social:  . 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 He is unsure what he will do after the sale. He reports lacking confidence he once had and himself. He does seem to have good insight that he needs to decrease the stress in his life      Past Medical History:   Diagnosis Date    Diabetes mellitus type II     Dyslipidemia     Goiter     Hypertension     Hypothyroidism     Psychiatric disorder     aniexty     Current Outpatient Prescriptions   Medication Sig    buPROPion (WELLBUTRIN) 100 mg tablet Take 1 Tab by mouth daily.  ALPRAZolam (XANAX) 0.5 mg tablet Take 1 Tab by mouth three (3) times daily as needed for Anxiety. Max Daily Amount: 1.5 mg.    traZODone (DESYREL) 50 mg tablet Take 1/2- 1 whole tablet at bedtime as needed for insomnia.     amLODIPine-benazepril (LOTREL) 5-40 mg per capsule TAKE 1 CAPSULE NIGHTLY FOR BLOOD PRESSURE    levothyroxine (SYNTHROID) 175 mcg tablet One tablet most days and 1/2 tablet on Sundays for 6.5 tablets/week (162.5 mcg/day average daily dose) (Patient taking differently: One tablet most days and 1/2 tablet on Sundays and Wednesdays for 6 tablets/week)    metFORMIN (GLUCOPHAGE) 1,000 mg tablet TAKE 1 TABLET TWICE A DAY WITH MEALS    rosuvastatin (CRESTOR) 20 mg tablet Take 1 Tab by mouth nightly. For cholesterol. Replaces atorvastatin     No current facility-administered medications for this visit. No Known Allergies    Review of Systems:  - Eyes: no blurry vision or double vision  - Cardiovascular: no chest pain  - Respiratory: no shortness of breath  - Musculoskeletal: no myalgias  - Neurological: no numbness/tingling in extremities    Physical Examination:  Visit Vitals    /83    Pulse 69    Ht 5' 8\" (1.727 m)    Wt 194 lb (88 kg)    BMI 29.5 kg/m2   -   - General: pleasant, no distress, normal gait   HEENT: hearing intact, EOMI, clear sclera without icterus  - Cardiovascular: regular, normal rate   - Respiratory: normal effort  - Integumentary: no edema   Diabetic foot exam:     Left: Filament test normal sensation with micro filament   Pulse DP: 2+ (normal)   Deformities: None      - Psychiatric: normal mood and affect    Data Reviewed:   Component      Latest Ref Rng & Units 7/26/2017 7/26/2017 4/21/2017 4/21/2017           7:39 AM  7:39 AM  7:59 AM  7:59 AM   TSH      0.450 - 4.500 uIU/mL  1.310  1.170   T4, Free      0.82 - 1.77 ng/dL 1.79 (H)  1.11      Component      Latest Ref Rng & Units 3/29/2017 3/29/2017 3/13/2017 3/13/2017           7:43 AM  7:43 AM  7:45 AM  7:45 AM   TSH      0.450 - 4.500 uIU/mL  0.321 (L) 0.085 (L)    T4, Free      0.82 - 1.77 ng/dL 1.82 (H)   1.71     Component      Latest Ref Rng & Units 4/21/2017 4/21/2017 4/21/2017           7:59 AM  7:59 AM  7:59 AM   Cholesterol, total      100 - 199 mg/dL 201 (H)     Triglyceride      0 - 149 mg/dL 100     HDL Cholesterol      >39 mg/dL 72     VLDL, calculated      5 - 40 mg/dL 20     LDL, calculated      0 - 99 mg/dL 109 (H)     Creatinine, urine      Not Estab. mg/dL   282.9   Microalbumin, urine      Not Estab. ug/mL   13.6   Microalbumin/Creat.  Ratio      0.0 - 30.0 mg/g creat   4.8   Hemoglobin A1c, (calculated)      4.8 - 5.6 %  6.0 (H)    Estimated average glucose      mg/dL  126        Assessment/Plan:   1. Postoperative hypothyroidism   -Labs are at goal.  Continue levothyroxine 150 mcg daily. Repeat labs in 3 months   2. Type 2 diabetes mellitus without complication, without long-term current use of insulin (HCC)   Continue efforts with diet, exercise and weight loss. Continue metformin   Reassess in 3 months     3. Dyslipidemia   -Continue rosuvastatin   4. HTN (hypertension), benign   - blood pressure controlled  -Continue amlodipine/benazepril       Patient Instructions   Thyroid:  Continue 150 mcg - change to 150 mcg tablet - take daily. Diabetes:   Continue metformin - 1000 mg twice daily     Cholesterol:   Continue rosuvastatin 20 mg. Blood pressure   continue amlodipine/benazepril - continue taking at night    Follow-up Disposition:  Return in about 3 months (around 10/28/2017).     Copy sent to:

## 2017-07-28 NOTE — MR AVS SNAPSHOT
Visit Information Date & Time Provider Department Dept. Phone Encounter #  
 7/28/2017  1:50 PM Robin Bajwa, [address] [place] [address] Diabetes and Endocrinology [phone] Follow-up Instructions Return in about 3 months (around 10/28/2017). Your Appointments 8/14/2017  1:00 PM  
ESTABLISHED PATIENT with Raymond Loera NP [facility] ([facility]) Appt Note: 5week follw-up [address] [facility] [address] [facility] Jennifer Natarajan 178  
  
   
 [address] [phone] Upcoming Health Maintenance Date Due Hepatitis C Screening 1960 Pneumococcal 19-64 Medium Risk (1 of 1 - PPSV23) 3/27/1980 DTaP/Tdap/Td series (1 - Tdap) 3/27/1982 FOBT Q 1 YEAR AGE 50-75 3/27/2011 EYE EXAM RETINAL OR DILATED Q1 8/27/2016 FOOT EXAM Q1 2/22/2017 INFLUENZA AGE 9 TO ADULT 8/1/2017 HEMOGLOBIN A1C Q6M 10/21/2017 MICROALBUMIN Q1 4/21/2018 LIPID PANEL Q1 4/21/2018 Allergies as of 7/28/2017  Review Complete On: 7/28/2017 By: Robin Bajwa MD  
 No Known Allergies Current Immunizations  Never Reviewed No immunizations on file. Not reviewed this visit You Were Diagnosed With   
  
 Codes Comments Postoperative hypothyroidism    -  Primary ICD-10-CM: E89.0 ICD-9-CM: 244.0 Type 2 diabetes mellitus without complication, without long-term current use of insulin (HCC)     ICD-10-CM: E11.9 ICD-9-CM: 250.00 Dyslipidemia     ICD-10-CM: E78.5 ICD-9-CM: 272.4 HTN (hypertension), benign     ICD-10-CM: I10 
ICD-9-CM: 401.1 Vitals BP Pulse Height(growth percentile) Weight(growth percentile) BMI Smoking Status 120/83 69 5' 8\" (1.727 m) 194 lb (88 kg) 29.5 kg/m2 Former Smoker Vitals History BMI and BSA Data Body Mass Index Body Surface Area  
 29.5 kg/m 2 2.05 m 2 Preferred Pharmacy Pharmacy Name Phone [id number] [address] [place] [address] [phone] Your Updated Medication List  
  
   
This list is accurate as of: 7/28/17  2:47 PM.  Always use your most recent med list.  
  
  
  
  
 ALPRAZolam 0.5 mg tablet Commonly known as:  Lohelenamica Fullers Take 1 Tab by mouth three (3) times daily as needed for Anxiety. Max Daily Amount: 1.5 mg.  
  
 amLODIPine-benazepril 5-40 mg per capsule Commonly known as:  LOTREL  
TAKE 1 CAPSULE NIGHTLY FOR BLOOD PRESSURE  
  
 buPROPion 100 mg tablet Commonly known as:  [facility] Take 1 Tab by mouth daily. levothyroxine 150 mcg tablet Commonly known as:  SYNTHROID Take 1 Tab by mouth Daily (before breakfast). metFORMIN 1,000 mg tablet Commonly known as:  GLUCOPHAGE  
TAKE 1 TABLET TWICE A DAY WITH MEALS  
  
 rosuvastatin 20 mg tablet Commonly known as:  CRESTOR Take 1 Tab by mouth nightly. For cholesterol. Replaces atorvastatin  
  
 traZODone 50 mg tablet Commonly known as:  Phoebe Hand Take 1/2- 1 whole tablet at bedtime as needed for insomnia. Prescriptions Sent to Pharmacy Refills  
 levothyroxine (SYNTHROID) 150 mcg tablet 3 Sig: Take 1 Tab by mouth Daily (before breakfast). Class: Normal  
 Pharmacy: [address] Ph #: [phone] Route: Oral  
  
We Performed the Following HEMOGLOBIN A1C WITH EAG [74784 CPT(R)] LIPID PANEL [37963 CPT(R)] METABOLIC PANEL, COMPREHENSIVE [07802 CPT(R)] MICROALBUMIN, UR, RAND W/ MICROALBUMIN/CREA RATIO [id number] CPT(R)] T4, FREE [id number] CPT(R)] TSH 3RD GENERATION [61630 CPT(R)] Follow-up Instructions Return in about 3 months (around 10/28/2017). Patient Instructions Thyroid: 
Continue 150 mcg - change to 150 mcg tablet - take daily. Diabetes:  
Continue metformin - 1000 mg twice daily Cholesterol:  
Continue rosuvastatin 20 mg. Blood pressure 
 continue amlodipine/benazepril - continue taking at night Introducing [facility] & HEALTH SERVICES! Nuha Parikh introduces [employer] patient portal. Now you can access parts of your medical record, email your doctor's office, and request medication refills online. 1. In your internet browser, go to [email or web address] 2. Click on the First Time User? Click Here link in the Sign In box. You will see the New Member Sign Up page. 3. Enter your [employer] Access Code exactly as it appears below. You will not need to use this code after youve completed the sign-up process. If you do not sign up before the expiration date, you must request a new code. · [employer] Access Code: [facility] Expires: 10/26/2017  2:46 PM 
 
4. Enter the last four digits of your Social Security Number (xxxx) and Date of Birth (mm/dd/yyyy) as indicated and click Submit. You will be taken to the next sign-up page. 5. Create a Play2Focus ID. This will be your Play[employer] login ID and cannot be changed, so think of one that is secure and easy to remember. 6. Create a Play[employer]Focus password. You can change your password at any time. 7. Enter your Password Reset Question and Answer. This can be used at a later time if you forget your password. 8. Enter your e-mail address. You will receive e-mail notification when new information is available in [address] 9. Click Sign Up. You can now view and download portions of your medical record. 10. Click the Download Summary menu link to download a portable copy of your medical information. If you have questions, please visit the Frequently Asked Questions section of the [employer] website. Remember, [employer] is NOT to be used for urgent needs. For medical emergencies, dial 911. Now available from your iPhone and Android! Please provide this summary of care documentation to your next provider. Your primary care clinician is listed as Cindy Flowers If you have any questions after today's visit, please call [phone].

## 2017-07-28 NOTE — PATIENT INSTRUCTIONS
Thyroid:  Continue 150 mcg - change to 150 mcg tablet - take daily. Diabetes:   Continue metformin - 1000 mg twice daily     Cholesterol:   Continue rosuvastatin 20 mg.       Blood pressure   continue amlodipine/benazepril - continue taking at night

## 2017-08-14 ENCOUNTER — OFFICE VISIT (OUTPATIENT)
Dept: BEHAVIORAL/MENTAL HEALTH CLINIC | Age: 57
End: 2017-08-14

## 2017-08-14 VITALS
HEIGHT: 68 IN | BODY MASS INDEX: 29.7 KG/M2 | WEIGHT: 196 LBS | HEART RATE: 68 BPM | DIASTOLIC BLOOD PRESSURE: 93 MMHG | SYSTOLIC BLOOD PRESSURE: 144 MMHG | OXYGEN SATURATION: 98 %

## 2017-08-14 DIAGNOSIS — F32.1 MODERATE SINGLE CURRENT EPISODE OF MAJOR DEPRESSIVE DISORDER (HCC): Primary | ICD-10-CM

## 2017-08-14 DIAGNOSIS — Z86.59 HISTORY OF PANIC ATTACKS: ICD-10-CM

## 2017-08-14 RX ORDER — QUETIAPINE FUMARATE 50 MG/1
TABLET, FILM COATED ORAL
Qty: 30 TAB | Refills: 1 | Status: SHIPPED | OUTPATIENT
Start: 2017-08-14 | End: 2017-09-25

## 2017-08-14 RX ORDER — ALPRAZOLAM 0.5 MG/1
0.5 TABLET ORAL
Qty: 90 TAB | Refills: 0 | Status: SHIPPED | OUTPATIENT
Start: 2017-08-14 | End: 2017-09-25 | Stop reason: SDUPTHER

## 2017-08-14 RX ORDER — BUPROPION HYDROCHLORIDE 100 MG/1
100 TABLET ORAL 2 TIMES DAILY
Qty: 60 TAB | Refills: 1 | Status: SHIPPED | OUTPATIENT
Start: 2017-08-14 | End: 2017-09-25 | Stop reason: SDUPTHER

## 2017-08-14 NOTE — PROGRESS NOTES
CHIEF COMPLAINT:  Phil Cosby is a 65 y.o. male and was seen today for follow-up of psychiatric condition and psychotropic medication management. HPI:    Phil Cosby is a 65 y.o. yo male who presents with symptoms of depression and anxiety. His PMH is significant for DM type 2, dyslipidemia, and hypothyroidism secondary to thyroidectomy in 2012. He has no history of inpt or outpt psychiatric care. He denies a history substance abuse or physical/ sexual/ emotional abuse. He lost a daughter secondary to SIDS and another daughter in an MVC when she was 19yo. He recently lost a cousin and a close friend who passed from a self-inflicted GSW in Nov 3849. Mr. Axel Mancia reports onset of depression and anxiety with a panic attack occurring late Nov- early Dec 2106. Depressive symptoms include sad moods, poor motivation and energy, poor appetite, anhedonia (shopping, yard work, eating out), hypersomnolence, isolating behaviors, nervousness, hopeless and helpless feelings, and generalized anxiety. He doubts himself and is not as confident in his decisions. His family, who is supportive, have been concerned about his depression and anxiety. 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 He sees Dr. Aryan Pedro for management of thyroid issues. FAMILY/SOCIAL HX:   x 11 years to his 2nd wife. He has 2 sons and an adult daughter.  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, [employer]     REVIEW OF SYSTEMS:  Psychiatric:  depression, anxiety  Appetite:weight increased by 4 lbs., 8lbs total   Sleep: does not feel rested, no change and poor with DIMS (difficulty initiating & maintaining sleep)   Neuro: none reported     Visit Vitals    BP (!) 144/93 (BP 1 Location: Left arm, BP Patient Position: Sitting)    Pulse 68    Ht 5' 8\" (1.727 m)    Wt 88.9 kg (196 lb)    SpO2 98%    BMI 29.8 kg/m2       Side Effects:  none    MENTAL STATUS EXAM: Sensorium  oriented to time, place and person   Relations cooperative   Appearance:  age appropriate and casually dressed   Motor Behavior:  gait stable and within normal limits   Speech:  normal pitch, normal volume and non-pressured   Thought Process: goal directed and logical   Thought Content free of delusions, free of hallucinations and not internally preoccupied    Suicidal ideations none   Homicidal ideations none   Mood:  euthymic   Affect:  euthymic   Memory recent  adequate   Memory remote:  adequate   Concentration:  adequate   Abstraction:  abstract   Insight:  good   Reliability good   Judgment:  good     MEDICAL DECISION MAKING:  Problems addressed today:    ICD-10-CM ICD-9-CM    1. Moderate single current episode of major depressive disorder (HCC) F32.1 296.22    2. History of panic attacks Z86.59 V11.8        Assessment:   Clayton Portillo is not responding to treatment, symptoms are continued insomnia and depression. He is the same as last visit, no benefit with increasing Wellbutrin dose to 100mg every day. He just had this refilled by his pharmacy. He feels unmotivated and is still having \"my good days and my bad days. \" He is trying to get out more and has been doing better with this. He and his wife recently attend the [facility] and he had a good time. He stopped taking the Trazodone as it was not beneficial for his sleep. He does not want to increase the dose. He still struggles with sleep and then takes naps during the day. 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 He noted significant depression and anxiety beginning in Nov 2016. He is able to reflect on how he has been since that time and feels that his moods and anxiety have improved. His son will be a sophomore in high school. He is doing well.  Mr. Christiano Atkins and his wife plan to take a vacation in Dec.        Current Outpatient Prescriptions   Medication Sig Dispense Refill    ALPRAZolam (XANAX) 0.5 mg tablet Take 1 Tab by mouth three (3) times daily as needed for Anxiety. Max Daily Amount: 1.5 mg. 90 Tab 0    buPROPion (WELLBUTRIN) 100 mg tablet Take 1 Tab by mouth two (2) times a day. 60 Tab 1    QUEtiapine (SEROQUEL) 50 mg tablet Take 1/2 to 1 whole tablet by mouth at bedtime as needed. 30 Tab 1    levothyroxine (SYNTHROID) 150 mcg tablet Take 1 Tab by mouth Daily (before breakfast). 90 Tab 3    amLODIPine-benazepril (LOTREL) 5-40 mg per capsule TAKE 1 CAPSULE NIGHTLY FOR BLOOD PRESSURE 90 Cap 2    metFORMIN (GLUCOPHAGE) 1,000 mg tablet TAKE 1 TABLET TWICE A DAY WITH MEALS 180 Tab 3    rosuvastatin (CRESTOR) 20 mg tablet Take 1 Tab by mouth nightly. For cholesterol. Replaces atorvastatin 90 Tab 3       Plan:   1. Medications/ Labs: Increase Wellbutrin from 100mg to 100mg bid for depression and anxiety. He was told not to take the afternoon dose after 4pm. Start Seroquel 25-50mg qhs PRN insomnia. Rxs sent to his pharmacy. Continue Xanax 0.5mg tid PRN anxiety. Rx handed to patient. 2.  Counseling and coordination of care including instructions for treatment, risks/benefits, risk factor reduction and patient/family education. He agrees with the plan. Patient instructed to call with any side effects, questions or issues. 3.  Follow-up Disposition:  Return in about 6 weeks (around 9/25/2017).     8/14/2017  Ashlee Carreno NP

## 2017-09-25 ENCOUNTER — OFFICE VISIT (OUTPATIENT)
Dept: BEHAVIORAL/MENTAL HEALTH CLINIC | Age: 57
End: 2017-09-25

## 2017-09-25 VITALS
BODY MASS INDEX: 29.7 KG/M2 | WEIGHT: 196 LBS | HEIGHT: 68 IN | HEART RATE: 101 BPM | OXYGEN SATURATION: 97 % | SYSTOLIC BLOOD PRESSURE: 116 MMHG | DIASTOLIC BLOOD PRESSURE: 80 MMHG

## 2017-09-25 DIAGNOSIS — F32.1 MODERATE SINGLE CURRENT EPISODE OF MAJOR DEPRESSIVE DISORDER (HCC): Primary | ICD-10-CM

## 2017-09-25 DIAGNOSIS — Z86.59 HISTORY OF PANIC ATTACKS: ICD-10-CM

## 2017-09-25 RX ORDER — ALPRAZOLAM 0.5 MG/1
0.5 TABLET ORAL
Qty: 90 TAB | Refills: 0 | Status: SHIPPED | OUTPATIENT
Start: 2017-09-25 | End: 2017-11-06 | Stop reason: SDUPTHER

## 2017-09-25 RX ORDER — BUPROPION HYDROCHLORIDE 100 MG/1
100 TABLET ORAL 2 TIMES DAILY
Qty: 60 TAB | Refills: 2 | Status: SHIPPED | OUTPATIENT
Start: 2017-09-25 | End: 2017-12-11 | Stop reason: SDUPTHER

## 2017-09-25 NOTE — MR AVS SNAPSHOT
Visit Information Date & Time Provider Department Dept. Phone Encounter #  
 9/25/2017  1:30 PM Barry Baez NP [facility] [id number] Your Appointments 11/16/2017 11:50 AM  
ROUTINE CARE with MD Tenzin Flor Diabetes and Endocrinology [facility] Appt Note: f/u diabetes cp0l;00  
 [address]  
[address] [facility] [address] [address] Upcoming Health Maintenance Date Due Hepatitis C Screening 1960 Pneumococcal 19-64 Medium Risk (1 of 1 - PPSV23) 3/27/1980 DTaP/Tdap/Td series (1 - Tdap) 3/27/1982 FOBT Q 1 YEAR AGE 50-75 3/27/2011 FOOT EXAM Q1 2/22/2017 INFLUENZA AGE 9 TO ADULT 8/1/2017 HEMOGLOBIN A1C Q6M 10/21/2017 MICROALBUMIN Q1 4/21/2018 LIPID PANEL Q1 4/21/2018 EYE EXAM RETINAL OR DILATED Q1 7/28/2018 Allergies as of 9/25/2017  Review Complete On: 9/25/2017 By: Alfredo Crane CNA No Known Allergies Current Immunizations  Never Reviewed No immunizations on file. Not reviewed this visit You Were Diagnosed With   
  
 Codes Comments Moderate single current episode of major depressive disorder ([facility]    -  Primary ICD-10-CM: F32.1 ICD-9-CM: 296.22 History of panic attacks     ICD-10-CM: Z86.59 
ICD-9-CM: V11.8 Vitals BP Pulse Height(growth percentile) Weight(growth percentile) SpO2 BMI  
 116/80 (BP 1 Location: Left arm, BP Patient Position: Sitting) (!) 101 5' 8\" (1.727 m) 196 lb (88.9 kg) 97% 29.8 kg/m2 Smoking Status Former Smoker Vitals History BMI and BSA Data Body Mass Index Body Surface Area  
 29.8 kg/m 2 2.07 m 2 Preferred Pharmacy Pharmacy Name Phone [id number] [phone] Your Updated Medication List  
  
   
 This list is accurate as of: 9/25/17  2:16 PM.  Always use your most recent med list.  
  
  
  
  
 ALPRAZolam 0.5 mg tablet Commonly known as:  Kasey Hopes Take 1 Tab by mouth three (3) times daily as needed for Anxiety. Max Daily Amount: 1.5 mg.  
  
 amLODIPine-benazepril 5-40 mg per capsule Commonly known as:  LOTREL  
TAKE 1 CAPSULE NIGHTLY FOR BLOOD PRESSURE  
  
 buPROPion 100 mg tablet Commonly known as:  [facility] Take 1 Tab by mouth two (2) times a day. levothyroxine 150 mcg tablet Commonly known as:  SYNTHROID Take 1 Tab by mouth Daily (before breakfast). metFORMIN 1,000 mg tablet Commonly known as:  GLUCOPHAGE  
TAKE 1 TABLET TWICE A DAY WITH MEALS  
  
 rosuvastatin 20 mg tablet Commonly known as:  CRESTOR Take 1 Tab by mouth nightly. For cholesterol. Replaces atorvastatin Prescriptions Printed Refills ALPRAZolam (XANAX) 0.5 mg tablet 0 Sig: Take 1 Tab by mouth three (3) times daily as needed for Anxiety. Max Daily Amount: 1.5 mg.  
 Class: Print Route: Oral  
  
Prescriptions Sent to Pharmacy Refills buPROPion (WELLBUTRIN) 100 mg tablet 2 Sig: Take 1 Tab by mouth two (2) times a day. Class: Normal  
 [id number] [address] [place] [address] #: [phone] Route: Oral  
  
Introducing \[facility]\"" & HEALTH SERVICES! [facility] introduces [employer] patient portal. Now you can access parts of your medical record, email your doctor's office, and request medication refills online. 1. In your internet browser, go to [email or web address] 2. Click on the First Time User? Click Here link in the Sign In box. You will see the New Member Sign Up page. 3. Enter your [employer] Access Code exactly as it appears below. You will not need to use this code after youve completed the sign-up process. If you do not sign up before the expiration date, you must request a new code. · [employer] Access Code: [facility] Expires: 10/26/2017  2:46 PM 
 
4. Enter the last four digits of your Social Security Number (xxxx) and Date of Birth (mm/dd/yyyy) as indicated and click Submit. You will be taken to the next sign-up page. 5. Create a QuotaDe[employer] ID. This will be your [employer] login ID and cannot be changed, so think of one that is secure and easy to remember. 6. Create a Qu[employer]De[employer] password. You can change your password at any time. 7. Enter your Password Reset Question and Answer. This can be used at a later time if you forget your password. 8. Enter your e-mail address. You will receive e-mail notification when new information is available in [address] 9. Click Sign Up. You can now view and download portions of your medical record. 10. Click the Download Summary menu link to download a portable copy of your medical information. If you have questions, please visit the Frequently Asked Questions section of the [employer] website. Remember, [employer] is NOT to be used for urgent needs. For medical emergencies, dial 911. Now available from your iPhone and Android! Please provide this summary of care documentation to your next provider. Your primary care clinician is listed as Bertram Jimenez If you have any questions after today's visit, please call [phone].

## 2017-09-25 NOTE — PROGRESS NOTES
CHIEF COMPLAINT:  Armida Camacho is a 65 y.o. male and was seen today for follow-up of psychiatric condition and psychotropic medication management. HPI:      Armida Camacho is a 65 y.o. yo male who presents with symptoms of depression and anxiety. His PMH is significant for DM type 2, dyslipidemia, and hypothyroidism secondary to thyroidectomy in 2012. He has no history of inpt or outpt psychiatric care. He denies a history substance abuse or physical/ sexual/ emotional abuse. He lost a daughter secondary to SIDS and another daughter in an MVC when she was 20yo. He recently lost a cousin and a close friend who passed from a self-inflicted GSW in Nov 2128. Mr. Colletta Hoyle reports onset of depression and anxiety with a panic attack occurring late Nov- early Dec 2106. Depressive symptoms include sad moods, poor motivation and energy, poor appetite, anhedonia (shopping, yard work, eating out), hypersomnolence, isolating behaviors, nervousness, hopeless and helpless feelings, and generalized anxiety. He doubts himself and is not as confident in his decisions. His family, who is supportive, have been concerned about his depression and anxiety. 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 He sees Dr. Huey Carmona for management of thyroid issues. FAMILY/SOCIAL HX:  x 11 years to his 2nd wife. He has 2 sons and an adult daughter.  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, [employer]     REVIEW OF SYSTEMS:  Psychiatric:  normal  Appetite:good and no change from normal   Sleep: no change   Neuro: none reported    Visit Vitals    /80 (BP 1 Location: Left arm, BP Patient Position: Sitting)    Pulse (!) 101    Ht 5' 8\" (1.727 m)    Wt 88.9 kg (196 lb)    SpO2 97%    BMI 29.8 kg/m2       Side Effects:  none    MENTAL STATUS EXAM:   Sensorium  oriented to time, place and person   Relations cooperative   Appearance:  age appropriate and casually dressed   Motor Behavior:  gait stable and within normal limits   Speech:  normal pitch, normal volume and non-pressured   Thought Process: goal directed and logical   Thought Content free of delusions, free of hallucinations and not internally preoccupied    Suicidal ideations none   Homicidal ideations none   Mood:  euthymic   Affect:  euthymic   Memory recent  adequate   Memory remote:  adequate   Concentration:  adequate   Abstraction:  abstract   Insight:  good   Reliability good   Judgment:  good     MEDICAL DECISION MAKING:  Problems addressed today:    ICD-10-CM ICD-9-CM    1. Moderate single current episode of major depressive disorder (HCC) F32.1 296.22    2. History of panic attacks Z86.59 V11.8        Assessment:   Oscar Bradley is responding to treatment, symptoms are improved. Increase in Wellbutrin has been helping with his depression. Seroquel was too sedating, even at 12.5mg qhs dose. 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 He may have to work one more holiday season, which he is not looking forward to. His son is having behavioral issues and they had a huge blow out recently. His son was recently suspended and is having problems with teachers. Oscar [facility] is concerned as his son is disrespectful toward's Eliezer's wife. He was given info for [employer] (his son is 14yo). Current Outpatient Prescriptions   Medication Sig Dispense Refill    buPROPion (WELLBUTRIN) 100 mg tablet Take 1 Tab by mouth two (2) times a day. 60 Tab 2    ALPRAZolam (XANAX) 0.5 mg tablet Take 1 Tab by mouth three (3) times daily as needed for Anxiety. Max Daily Amount: 1.5 mg. 90 Tab 0    levothyroxine (SYNTHROID) 150 mcg tablet Take 1 Tab by mouth Daily (before breakfast).  90 Tab 3    amLODIPine-benazepril (LOTREL) 5-40 mg per capsule TAKE 1 CAPSULE NIGHTLY FOR BLOOD PRESSURE 90 Cap 2    metFORMIN (GLUCOPHAGE) 1,000 mg tablet TAKE 1 TABLET TWICE A DAY WITH MEALS 180 Tab 3    rosuvastatin (CRESTOR) 20 mg tablet Take 1 Tab by mouth nightly. For cholesterol. Replaces atorvastatin 90 Tab 3       Plan:   1. Medications/ Labs:  Continue Wellbutrin from 100mg to 100mg bid for depression and anxiety. Rxs sent to his pharmacy. Continue Xanax 0.5mg tid PRN anxiety. Rx handed to patient. 2.  Counseling and coordination of care including instructions for treatment, risks/benefits, risk factor reduction and patient/family education. He agrees with the plan. Patient instructed to call with any side effects, questions or issues.      3.  Follow-up Disposition:  Return in about 6 weeks (around 11/6/2017).    9/25/2017  Gabrielle Whelan NP

## 2017-10-26 RX ORDER — METFORMIN HYDROCHLORIDE 1000 MG/1
TABLET ORAL
Qty: 180 TAB | Refills: 3 | Status: SHIPPED | OUTPATIENT
Start: 2017-10-26 | End: 2018-10-22 | Stop reason: SDUPTHER

## 2017-11-06 ENCOUNTER — OFFICE VISIT (OUTPATIENT)
Dept: BEHAVIORAL/MENTAL HEALTH CLINIC | Age: 57
End: 2017-11-06

## 2017-11-06 VITALS
HEART RATE: 67 BPM | DIASTOLIC BLOOD PRESSURE: 98 MMHG | SYSTOLIC BLOOD PRESSURE: 147 MMHG | OXYGEN SATURATION: 98 % | HEIGHT: 68 IN | WEIGHT: 197 LBS | BODY MASS INDEX: 29.86 KG/M2

## 2017-11-06 DIAGNOSIS — Z86.59 HISTORY OF PANIC ATTACKS: ICD-10-CM

## 2017-11-06 DIAGNOSIS — F32.1 MODERATE SINGLE CURRENT EPISODE OF MAJOR DEPRESSIVE DISORDER (HCC): Primary | ICD-10-CM

## 2017-11-06 RX ORDER — TRAZODONE HYDROCHLORIDE 50 MG/1
50 TABLET ORAL
Qty: 30 TAB | Refills: 0 | Status: SHIPPED | OUTPATIENT
Start: 2017-11-06 | End: 2017-11-06 | Stop reason: SDUPTHER

## 2017-11-06 RX ORDER — TRAZODONE HYDROCHLORIDE 50 MG/1
50 TABLET ORAL
Qty: 30 TAB | Refills: 0 | Status: SHIPPED | OUTPATIENT
Start: 2017-11-06 | End: 2017-12-11 | Stop reason: SDUPTHER

## 2017-11-06 RX ORDER — ALPRAZOLAM 1 MG/1
1 TABLET ORAL DAILY
Qty: 30 TAB | Refills: 1 | Status: SHIPPED | OUTPATIENT
Start: 2017-11-06 | End: 2018-04-16

## 2017-11-06 NOTE — PROGRESS NOTES
CHIEF COMPLAINT:  Artemio Mccauley is a 65 y.o. male and was seen today for follow-up of psychiatric condition and psychotropic medication management. Last office visit was Sept 2017. HPI:    Artemio Mccauley is a 65 y.o. yo male who presents with symptoms of depression and anxiety. His PMH is significant for DM type 2, dyslipidemia, and hypothyroidism secondary to thyroidectomy in 2012. He has no history of inpt or outpt psychiatric care. He denies a history substance abuse or physical/ sexual/ emotional abuse. He lost a daughter secondary to SIDS and another daughter in an MVC when she was 18yo. He recently lost a cousin and a close friend who passed from a self-inflicted GSW in Nov 7252. Mr. Fabi Brock reports onset of depression and anxiety with a panic attack occurring late Nov- early Dec 2106. Depressive symptoms include sad moods, poor motivation and energy, poor appetite, anhedonia (shopping, yard work, eating out), hypersomnolence, isolating behaviors, nervousness, hopeless and helpless feelings, and generalized anxiety. He doubts himself and is not as confident in his decisions. His family, who is supportive, have been concerned about his depression and anxiety. 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  He sees Dr. Theron Rich for management of thyroid issues.     FAMILY/SOCIAL HX:  x >10 years to his 2nd wife, has 2 sons and an adult daughter, graduated high school, contractor with [employer] x 10 yrs, Orthodox     REVIEW OF SYSTEMS:  Psychiatric:  normal  Appetite:good   Sleep: good with Trazodone  Neuro: none reported   JER: social drinker    Visit Vitals    BP (!) 147/98 (BP 1 Location: Left arm, BP Patient Position: Sitting)    Pulse 67    Ht 5' 8\" (1.727 m)    Wt 89.4 kg (197 lb)    SpO2 98%    BMI 29.95 kg/m2       Side Effects:  none    MENTAL STATUS EXAM:   Sensorium  oriented to time, place and person   Relations cooperative   Appearance:  age appropriate and casually dressed   Motor Behavior:  gait stable and within normal limits   Speech:  normal pitch, normal volume and non-pressured   Thought Process: goal directed and logical   Thought Content free of delusions, free of hallucinations and not internally preoccupied    Suicidal ideations none   Homicidal ideations none   Mood:  euthymic   Affect:  euthymic   Memory recent  adequate   Memory remote:  adequate   Concentration:  adequate   Abstraction:  abstract   Insight:  good   Reliability good   Judgment:  good     MEDICAL DECISION MAKING:  Problems addressed today:    ICD-10-CM ICD-9-CM    1. Moderate single current episode of major depressive disorder (HCC) F32.1 296.22    2. History of panic attacks Z86.59 V11.8        Assessment:   Marlena Ortega is responding to treatment, symptoms are stable. Patient reports that there are no changes to his medical conditions. He recently got news that the  for his business fell through. This was a deal in the making for several months. He felt depressed and was isolating and anxious for a week, but has had a resurgence and is doing well. This is also the anniversary of when his friend took his life a year ago. Moods are mainly stable and he is taking Trazodone for sleep, which is helpful. His son started therapy at [facility]. He is not taking his Xanax everyday, but is worried about worsening anxiety with the holiday rush at [employer]. He is asking to go back to the 1mg dose of Xanax and agrees to cut in 1/2 every day-bid, and take a few days off on weekends. Current Outpatient Prescriptions   Medication Sig Dispense Refill    ALPRAZolam (XANAX) 1 mg tablet Take 1 Tab by mouth daily. Max Daily Amount: 1 mg. 30 Tab 1    traZODone (DESYREL) 50 mg tablet Take 1 Tab by mouth nightly. 30 Tab 0    metFORMIN (GLUCOPHAGE) 1,000 mg tablet TAKE 1 TABLET TWICE A DAY WITH MEALS 180 Tab 3    rosuvastatin (CRESTOR) 20 mg tablet Take 1 Tab by mouth daily.  90 Tab 3    buPROPion (WELLBUTRIN) 100 mg tablet Take 1 Tab by mouth two (2) times a day. 60 Tab 2    levothyroxine (SYNTHROID) 150 mcg tablet Take 1 Tab by mouth Daily (before breakfast). 90 Tab 3    amLODIPine-benazepril (LOTREL) 5-40 mg per capsule TAKE 1 CAPSULE NIGHTLY FOR BLOOD PRESSURE 90 Cap 2       Plan:   1. Medications/ Labs: Continue Wellbutrin 100mg bid for depression and anxiety. Increase PRN Xanax from 0.5mg every day to 1mg every day during the holiday season. He agrees not to use this daily. He is not taking narcotic pain medication. Rx handed to patient. Continue Trazodone 50mg qhs PRN insomnia. 2.  Counseling and coordination of care including instructions for treatment, risks/benefits, risk factor reduction and patient/family education. He agrees with the plan. Patient instructed to call with any side effects, questions or issues. 3.  Follow-up Disposition:  Return in about 1 month (around 12/6/2017).     11/6/2017  Yaakov Mcclain NP

## 2017-11-15 LAB
ALBUMIN SERPL-MCNC: 4.7 G/DL (ref 3.5–5.5)
ALBUMIN/CREAT UR: 9.8 MG/G CREAT (ref 0–30)
ALBUMIN/GLOB SERPL: 1.7 {RATIO} (ref 1.2–2.2)
ALP SERPL-CCNC: 62 IU/L (ref 39–117)
ALT SERPL-CCNC: 29 IU/L (ref 0–44)
AST SERPL-CCNC: 29 IU/L (ref 0–40)
BILIRUB SERPL-MCNC: 0.5 MG/DL (ref 0–1.2)
BUN SERPL-MCNC: 11 MG/DL (ref 6–24)
BUN/CREAT SERPL: 10 (ref 9–20)
CALCIUM SERPL-MCNC: 9.5 MG/DL (ref 8.7–10.2)
CHLORIDE SERPL-SCNC: 96 MMOL/L (ref 96–106)
CHOLEST SERPL-MCNC: 208 MG/DL (ref 100–199)
CO2 SERPL-SCNC: 25 MMOL/L (ref 18–29)
CREAT SERPL-MCNC: 1.07 MG/DL (ref 0.76–1.27)
CREAT UR-MCNC: 248.5 MG/DL
EST. AVERAGE GLUCOSE BLD GHB EST-MCNC: 123 MG/DL
GFR SERPLBLD CREATININE-BSD FMLA CKD-EPI: 77 ML/MIN/1.73
GFR SERPLBLD CREATININE-BSD FMLA CKD-EPI: 89 ML/MIN/1.73
GLOBULIN SER CALC-MCNC: 2.8 G/DL (ref 1.5–4.5)
GLUCOSE SERPL-MCNC: 159 MG/DL (ref 65–99)
HBA1C MFR BLD: 5.9 % (ref 4.8–5.6)
HDLC SERPL-MCNC: 61 MG/DL
LDLC SERPL CALC-MCNC: 124 MG/DL (ref 0–99)
MICROALBUMIN UR-MCNC: 24.4 UG/ML
POTASSIUM SERPL-SCNC: 4.1 MMOL/L (ref 3.5–5.2)
PROT SERPL-MCNC: 7.5 G/DL (ref 6–8.5)
SODIUM SERPL-SCNC: 139 MMOL/L (ref 134–144)
T4 FREE SERPL-MCNC: 1.66 NG/DL (ref 0.82–1.77)
TRIGL SERPL-MCNC: 116 MG/DL (ref 0–149)
TSH SERPL DL<=0.005 MIU/L-ACNC: 4.7 UIU/ML (ref 0.45–4.5)
VLDLC SERPL CALC-MCNC: 23 MG/DL (ref 5–40)

## 2017-11-16 ENCOUNTER — OFFICE VISIT (OUTPATIENT)
Dept: ENDOCRINOLOGY | Age: 57
End: 2017-11-16

## 2017-11-16 VITALS
HEIGHT: 68 IN | DIASTOLIC BLOOD PRESSURE: 92 MMHG | SYSTOLIC BLOOD PRESSURE: 137 MMHG | HEART RATE: 89 BPM | WEIGHT: 199 LBS | BODY MASS INDEX: 30.16 KG/M2

## 2017-11-16 DIAGNOSIS — E89.0 POSTOPERATIVE HYPOTHYROIDISM: ICD-10-CM

## 2017-11-16 DIAGNOSIS — E11.9 TYPE 2 DIABETES MELLITUS WITHOUT COMPLICATION, WITHOUT LONG-TERM CURRENT USE OF INSULIN (HCC): Primary | ICD-10-CM

## 2017-11-16 DIAGNOSIS — E78.5 DYSLIPIDEMIA: ICD-10-CM

## 2017-11-16 DIAGNOSIS — I10 HTN (HYPERTENSION), BENIGN: ICD-10-CM

## 2017-11-16 NOTE — PROGRESS NOTES
History of Present Illness: Herbie Downs is a 65 y.o. male presents for follow-up of diabetes and hypothyroidism. He also has HTN and dyslipidemia. He also has problems with depression and anxiety. He is following with behavioral health regularly fort this. Hypothyroid since thyroidectomy was done in summer 2012. Dose has fluctuated considerably. Dose was as high as 300 mcg in 2014 and as low as 150 mcg currently. Currently taking 150 mcg. TSH mildly elevated with pre-labs after being normal in spring and summer earlier this year  He may have missed 1-2 tablets over the last month. He does not make up for a missed dose. Diabetes   Dx around 2012. No complications. Medications: Taking metformin 1 g BID  Exercise -still exercising. Weight is overall stable and remains down 5-10 lbs from his higher weights. Lipids: rosuvastatin 20 mg     HTN - + history of microalbuminuria in 2014. Now normal.  BP controlled   Taking amlodipine/benazepril at night. 171-288 is systolic when he checks at home. Social:  . 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 Max Daily Amount: 1 mg.  traZODone (DESYREL) 50 mg tablet Take 1 Tab by mouth nightly.  metFORMIN (GLUCOPHAGE) 1,000 mg tablet TAKE 1 TABLET TWICE A DAY WITH MEALS    rosuvastatin (CRESTOR) 20 mg tablet Take 1 Tab by mouth daily.  buPROPion (WELLBUTRIN) 100 mg tablet Take 1 Tab by mouth two (2) times a day.  levothyroxine (SYNTHROID) 150 mcg tablet Take 1 Tab by mouth Daily (before breakfast).     amLODIPine-benazepril (LOTREL) 5-40 mg per capsule TAKE 1 CAPSULE NIGHTLY FOR BLOOD PRESSURE     No current facility-administered medications for this visit. No Known Allergies    Review of Systems:  - Eyes: no blurry vision or double vision  - Cardiovascular: no chest pain  - Respiratory: no shortness of breath  - Musculoskeletal: no myalgias  - Neurological: no numbness/tingling in extremities    Physical Examination:  Visit Vitals    BP (!) 137/92    Pulse 89    Ht 5' 8\" (1.727 m)    Wt 199 lb (90.3 kg)    BMI 30.26 kg/m2   -   - General: pleasant, no distress, normal gait   HEENT: hearing intact, EOMI, clear sclera without icterus  - Cardiovascular: regular, normal rate   - Respiratory: normal effort  - Integumentary: no edema   Diabetic foot exam:     Right: Filament test normal sensation with micro filament   Pulse DP: 2+ (normal)   Deformities: None    - Psychiatric: normal mood and affect    Data Reviewed:   Component      Latest Ref Rng & Units 11/14/2017 11/14/2017 11/14/2017 11/14/2017           8:17 AM  8:17 AM  8:17 AM  8:17 AM   Glucose      65 - 99 mg/dL    159 (H)   BUN      6 - 24 mg/dL    11   Creatinine      0.76 - 1.27 mg/dL    1.07   GFR est non-AA      >59 mL/min/1.73    77   GFR est AA      >59 mL/min/1.73    89   BUN/Creatinine ratio      9 - 20    10   Sodium      134 - 144 mmol/L    139   Potassium      3.5 - 5.2 mmol/L    4.1   Chloride      96 - 106 mmol/L    96   CO2      18 - 29 mmol/L    25   Calcium      8.7 - 10.2 mg/dL    9.5   Protein, total      6.0 - 8.5 g/dL    7.5   Albumin      3.5 - 5.5 g/dL    4.7   GLOBULIN, TOTAL      1.5 - 4.5 g/dL    2.8   A-G Ratio      1.2 - 2.2    1.7   Bilirubin, total      0.0 - 1.2 mg/dL    0.5   Alk.  phosphatase      39 - 117 IU/L    62   AST      0 - 40 IU/L    29   ALT (SGPT)      0 - 44 IU/L    29   Cholesterol, total      100 - 199 mg/dL   208 (H)    Triglyceride      0 - 149 mg/dL   116    HDL Cholesterol      >39 mg/dL   61    VLDL, calculated      5 - 40 mg/dL   23    LDL, calculated      0 - 99 mg/dL   124 (H)    Creatinine, urine      Not Estab. mg/dL 248.5      Microalbumin, urine      Not Estab. ug/mL 24.4      Microalbumin/Creat. Ratio      0.0 - 30.0 mg/g creat 9.8      Hemoglobin A1c, (calculated)      4.8 - 5.6 %  5.9 (H)     Estimated average glucose      mg/dL  123       SH      0.450 - 4.500 uIU/mL  4.700 (H)   T4, Free      0.82 - 1.77 ng/dL 1.66          Assessment/Plan:   1. Type 2 diabetes mellitus without complication, without long-term current use of insulin (HCC)   Well controlled with metformin, dietary efforts, exercise, weight loss. - reassess A1c in 6 months. Encouraged lifestyle efforts. 2. Postoperative hypothyroidism   - TSH mildly elevated. Suspect this may be due to him missing 1-2 tablets every few weeks (missing 1 tablet every 2 weeks would bring daily average dose down to 140mcg/day). Recommended he make up for missed doses and assure 7 tablets every week. Continue Synthroid 150 mcg daily  Will reassess in 6 months. 3. Dyslipidemia   - continue rosuvastatin  - encouraged healthy diet and exercise. Further weight loss would likely be beneficial   4. BMI 30.0-30.9,adult    5. HTN: controlled. Continue amlodipine/benazepril 5/40 mg at night. Patient Instructions   Thyroid:  Continue 150 mcg -   ** Take missed doses. Assure 7 tablets every week. Diabetes:   Continue metformin - 1000 mg twice daily     Cholesterol:   Continue rosuvastatin 20 mg. Blood pressure   continue amlodipine/benazepril - continue taking at night  Goal BP at home: < 130.  < 120 is optimal     Follow-up Disposition:  Return in about 6 months (around 5/16/2018).     Copy sent to:

## 2017-11-16 NOTE — PATIENT INSTRUCTIONS
Thyroid:  Continue 150 mcg -   ** Take missed doses. Assure 7 tablets every week. Diabetes:   Continue metformin - 1000 mg twice daily     Cholesterol:   Continue rosuvastatin 20 mg.       Blood pressure   continue amlodipine/benazepril - continue taking at night  Goal BP at home: < 130.  < 120 is optimal

## 2017-11-16 NOTE — MR AVS SNAPSHOT
Visit Information Date & Time Provider Department Dept. Phone Encounter #  
 11/16/2017 11:50 AM Dee Dee Dowd, [address] [facility] [id number] Follow-up Instructions Return in about 6 months (around 5/16/2018). Your Appointments 12/11/2017  1:00 PM  
ESTABLISHED PATIENT with Sunny Tilley, [facility] ([facility]velace) Appt Note: 6 week follow-up [address] [facility] Jennifer Patel [address]  
  
   
 [address] [place] [address] Upcoming Health Maintenance Date Due Hepatitis C Screening 1960 Pneumococcal 19-64 Medium Risk (1 of 1 - PPSV23) 3/27/1980 DTaP/Tdap/Td series (1 - Tdap) 3/27/1982 FOBT Q 1 YEAR AGE 50-75 3/27/2011 FOOT EXAM Q1 2/22/2017 Influenza Age 5 to Adult 8/1/2017 HEMOGLOBIN A1C Q6M 5/14/2018 EYE EXAM RETINAL OR DILATED Q1 7/28/2018 MICROALBUMIN Q1 11/14/2018 LIPID PANEL Q1 11/14/2018 Allergies as of 11/16/2017  Review Complete On: 11/16/2017 By: Nicolas Hess LPN No Known Allergies Current Immunizations  Never Reviewed No immunizations on file. Not reviewed this visit You Were Diagnosed With   
  
 Codes Comments Type 2 diabetes mellitus without complication, without long-term current use of insulin (HCC)    -  Primary ICD-10-CM: E11.9 ICD-9-CM: 250.00 Postoperative hypothyroidism     ICD-10-CM: E89.0 ICD-9-CM: 244.0 Dyslipidemia     ICD-10-CM: E78.5 ICD-9-CM: 272.4 BMI 30.0-30.9,adult     ICD-10-CM: Z68.30 ICD-9-CM: V85.30 Vitals BP Pulse Height(growth percentile) Weight(growth percentile) BMI Smoking Status (!) 137/92 89 5' 8\" (1.727 m) 199 lb (90.3 kg) 30.26 kg/m2 Former Smoker Vitals History BMI and BSA Data Body Mass Index Body Surface Area  
 30.26 kg/m 2 2.08 m 2 Preferred Pharmacy Pharmacy Name Phone [phone] [phone] Your Updated Medication List  
  
   
This list is accurate as of: 11/16/17  1:03 PM.  Always use your most recent med list.  
  
  
  
  
 ALPRAZolam 1 mg tablet Commonly known as:  Huger Mary Take 1 Tab by mouth daily. Max Daily Amount: 1 mg. amLODIPine-benazepril 5-40 mg per capsule Commonly known as:  LOTREL  
TAKE 1 CAPSULE NIGHTLY FOR BLOOD PRESSURE  
  
 buPROPion 100 mg tablet Commonly known as:  [facility] Take 1 Tab by mouth two (2) times a day. levothyroxine 150 mcg tablet Commonly known as:  SYNTHROID Take 1 Tab by mouth Daily (before breakfast). metFORMIN 1,000 mg tablet Commonly known as:  GLUCOPHAGE  
TAKE 1 TABLET TWICE A DAY WITH MEALS  
  
 rosuvastatin 20 mg tablet Commonly known as:  CRESTOR Take 1 Tab by mouth daily. traZODone 50 mg tablet Commonly known as:  Ramses Cower Take 1 Tab by mouth nightly. We Performed the Following HEMOGLOBIN A1C WITH EAG [95397 CPT(R)] TSH 3RD GENERATION [06569 CPT(R)] Follow-up Instructions Return in about 6 months (around 5/16/2018). Patient Instructions Thyroid: 
Continue 150 mcg -  
** Take missed doses. Assure 7 tablets every week. Diabetes:  
Continue metformin - 1000 mg twice daily Cholesterol:  
Continue rosuvastatin 20 mg. Blood pressure 
 continue amlodipine/benazepril - continue taking at night Goal BP at home: < 130.  < 120 is optimal  
 
 
  
Introducing [facility]! Vijaya Mak introduces [employer] patient portal. Now you can access parts of your medical record, email your doctor's office, and request medication refills online. 1. In your internet browser, go to [email or web address] 2. Click on the First Time User? Click Here link in the Sign In box. You will see the New Member Sign Up page. 3. Enter your [employer] Access Code exactly as it appears below. You will not need to use this code after youve completed the sign-up process. If you do not sign up before the expiration date, you must request a new code. · [employer] Access Code: [email or web address] Expires: 2/14/2018  1:03 PM 
 
4. Enter the last four digits of your Social Security Number (xxxx) and Date of Birth (mm/dd/yyyy) as indicated and click Submit. You will be taken to the next sign-up page. 5. Create a [employer] ID. This will be your [employer] login ID and cannot be changed, so think of one that is secure and easy to remember. 6. Create a [employer] password. You can change your password at any time. 7. Enter your Password Reset Question and Answer. This can be used at a later time if you forget your password. 8. Enter your e-mail address. You will receive e-mail notification when new information is available in [address]. 9. Click Sign Up. You can now view and download portions of your medical record. 10. Click the Download Summary menu link to download a portable copy of your medical information. If you have questions, please visit the Frequently Asked Questions section of the [employer] website. Remember, [employer] is NOT to be used for urgent needs. For medical emergencies, dial 911. Now available from your iPhone and Android! Please provide this summary of care documentation to your next provider. Your primary care clinician is listed as Evelin Nobles If you have any questions after today's visit, please call [phone].

## 2017-12-11 ENCOUNTER — OFFICE VISIT (OUTPATIENT)
Dept: BEHAVIORAL/MENTAL HEALTH CLINIC | Age: 57
End: 2017-12-11

## 2017-12-11 VITALS
SYSTOLIC BLOOD PRESSURE: 132 MMHG | HEIGHT: 68 IN | DIASTOLIC BLOOD PRESSURE: 98 MMHG | WEIGHT: 200 LBS | BODY MASS INDEX: 30.31 KG/M2 | HEART RATE: 74 BPM | OXYGEN SATURATION: 98 %

## 2017-12-11 DIAGNOSIS — Z86.59 HISTORY OF PANIC ATTACKS: ICD-10-CM

## 2017-12-11 DIAGNOSIS — F32.1 MODERATE SINGLE CURRENT EPISODE OF MAJOR DEPRESSIVE DISORDER (HCC): Primary | ICD-10-CM

## 2017-12-11 RX ORDER — TRAZODONE HYDROCHLORIDE 50 MG/1
50 TABLET ORAL
Qty: 30 TAB | Refills: 2 | Status: SHIPPED | OUTPATIENT
Start: 2017-12-11 | End: 2018-04-16

## 2017-12-11 RX ORDER — ALPRAZOLAM 0.5 MG/1
TABLET ORAL
Refills: 0 | COMMUNITY
Start: 2017-12-08 | End: 2018-01-23 | Stop reason: SDUPTHER

## 2017-12-11 RX ORDER — BUPROPION HYDROCHLORIDE 100 MG/1
100 TABLET ORAL 2 TIMES DAILY
Qty: 60 TAB | Refills: 0 | Status: SHIPPED | OUTPATIENT
Start: 2017-12-11 | End: 2018-04-16

## 2017-12-11 NOTE — PROGRESS NOTES
CHIEF COMPLAINT:  Lisa Carrero is a 65 y.o. male and was seen today for follow-up of psychiatric condition and psychotropic medication management. Last office visit was Nov 2017. HPI:     Lisa Carrero is a 65 y.o. male who presents with symptoms of depression and anxiety. His PMH is significant for DM type 2, dyslipidemia, and hypothyroidism secondary to thyroidectomy in 2012. He has no history of inpt or outpt psychiatric care. He denies a history substance abuse or physical/ sexual/ emotional abuse. He lost a daughter secondary to SIDS and another daughter in an MVC when she was 18yo. He lost a cousin and a close friend who passed from a self-inflicted GSW in Nov 8780. Mr. Dino Fong reports onset of depression and anxiety with a panic attack occurring late Nov- early Dec 2106. Depressive symptoms include sad moods, poor motivation and energy, poor appetite, anhedonia (shopping, yard work, eating out), hypersomnolence, isolating behaviors, nervousness, hopeless and helpless feelings, and generalized anxiety. He doubts himself and is not as confident in his decisions. His family, who is supportive, have been concerned about his depression and anxiety. 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 He sees Dr. Roger Burks for management of thyroid issues.     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    Sleep: good with Trazodone  Neuro: none reported  JER: social drinker    Visit Vitals    BP (!) 132/98 (BP 1 Location: Left arm, BP Patient Position: Sitting)    Pulse 74    Ht 5' 8\" (1.727 m)    Wt 90.7 kg (200 lb)    SpO2 98%    BMI 30.41 kg/m2         Side Effects:  none    MENTAL STATUS EXAM:   Sensorium  oriented to time, place and person   Relations cooperative Appearance:  age appropriate and casually dressed   Motor Behavior:  gait stable and within normal limits   Speech:  normal pitch, normal volume and non-pressured   Thought Process: goal directed and logical   Thought Content free of delusions, free of hallucinations and not internally preoccupied    Suicidal ideations none   Homicidal ideations none   Mood:  euthymic   Affect:  euthymic   Memory recent  adequate   Memory remote:  adequate   Concentration:  adequate   Abstraction:  abstract   Insight:  good   Reliability good   Judgment:  good     MEDICAL DECISION MAKING:  Problems addressed today:    ICD-10-CM ICD-9-CM    1. Moderate single current episode of major depressive disorder (HCC) F32.1 296.22    2. History of panic attacks Z86.59 V11.8        Assessment:   Bethany Danielle is responding to treatment, symptoms are stable. Patient reports that there are no changes to his medical conditions. He is doing well and notes stable moods and anxiety. He has not needed to take the increased dose of Xanax. He is taking less Wellbutrin- some days off and on- and he wants to come off of it. He is busy at work at [employer]. His eldest son is having more behavioral issues and so is now staying with his mother. His son is connected with [facility] and will soon see a psychologist there. Despite increasing stressors, Bethany Danielle is handling it well and \"is managing. \" He plans to take a vacation with his wife after the Holidays. Current Outpatient Prescriptions   Medication Sig Dispense Refill    ALPRAZolam (XANAX) 0.5 mg tablet take 1 tablet by mouth three times a day if needed for anxiety  0    traZODone (DESYREL) 50 mg tablet Take 1 Tab by mouth nightly. 30 Tab 2    buPROPion (WELLBUTRIN) 100 mg tablet Take 1 Tab by mouth two (2) times a day. 60 Tab 0    ALPRAZolam (XANAX) 1 mg tablet Take 1 Tab by mouth daily.  Max Daily Amount: 1 mg. 30 Tab 1    metFORMIN (GLUCOPHAGE) 1,000 mg tablet TAKE 1 TABLET TWICE A DAY WITH MEALS 180 Tab 3    rosuvastatin (CRESTOR) 20 mg tablet Take 1 Tab by mouth daily. 90 Tab 3    levothyroxine (SYNTHROID) 150 mcg tablet Take 1 Tab by mouth Daily (before breakfast). 90 Tab 3    amLODIPine-benazepril (LOTREL) 5-40 mg per capsule TAKE 1 CAPSULE NIGHTLY FOR BLOOD PRESSURE 90 Cap 2       Plan:   1. Medications/ Labs: Continue Wellbutrin 100mg bid for depression and anxiety. Continue Xanax from 0.5mg- 1mg every day PRN severe anxiety. Continue Trazodone 50mg qhs PRN insomnia. He declines rx for Xanax. Rxs for Wellbutrin and Trazodone sent to his pharmacy. 2.  Counseling and coordination of care including instructions for treatment, risks/benefits, risk factor reduction and patient/family education. He agrees with the plan. Patient instructed to call with any side effects, questions or issues. 3.  Follow-up Disposition:  Return in about 3 months (around 3/11/2018).     12/11/2017  Yaakov Mcclain NP

## 2017-12-11 NOTE — MR AVS SNAPSHOT
Visit Information Date & Time Provider Department Dept. Phone Encounter #  
 12/11/2017  1:00 PM Jocy Snyder NP [facility] [phone] Your Appointments 4/16/2018  9:50 AM  
ROUTINE CARE with MD Tenzin Mackey Diabetes and Endocrinology [facility]) Appt Note: f/u diabetes cp45.00  
 [address] [facility] [address] [place] [address] [phone] [place] [address] Upcoming Health Maintenance Date Due Hepatitis C Screening 1960 Pneumococcal 19-64 Medium Risk (1 of 1 - PPSV23) 3/27/1980 DTaP/Tdap/Td series (1 - Tdap) 3/27/1982 FOBT Q 1 YEAR AGE 50-75 3/27/2011 Influenza Age 5 to Adult 8/1/2017 HEMOGLOBIN A1C Q6M 5/14/2018 EYE EXAM RETINAL OR DILATED Q1 7/28/2018 MICROALBUMIN Q1 11/14/2018 LIPID PANEL Q1 11/14/2018 FOOT EXAM Q1 11/16/2018 Allergies as of 12/11/2017  Review Complete On: 12/11/2017 By: Franklin Carrion CNA No Known Allergies Current Immunizations  Never Reviewed No immunizations on file. Not reviewed this visit You Were Diagnosed With   
  
 Codes Comments Moderate single current episode of major depressive disorder ([facility]    -  Primary ICD-10-CM: F32.1 ICD-9-CM: 296.22 History of panic attacks     ICD-10-CM: Z86.59 
ICD-9-CM: V11.8 Vitals BP Pulse Height(growth percentile) Weight(growth percentile) SpO2 BMI  
 (!) 132/98 (BP 1 Location: Left arm, BP Patient Position: Sitting) 74 5' 8\" (1.727 m) 200 lb (90.7 kg) 98% 30.41 kg/m2 Smoking Status Former Smoker Vitals History BMI and BSA Data Body Mass Index Body Surface Area  
 30.41 kg/m 2 2.09 m 2 Preferred Pharmacy Pharmacy Name Phone [phone] [phone] Your Updated Medication List  
  
   
 This list is accurate as of: 12/11/17  1:14 PM.  Always use your most recent med list.  
  
  
  
  
 * ALPRAZolam 1 mg tablet Commonly known as:  Pranav Christian Take 1 Tab by mouth daily. Max Daily Amount: 1 mg.  
  
 * ALPRAZolam 0.5 mg tablet Commonly known as:  Pranav Christian  
take 1 tablet by mouth three times a day if needed for anxiety  
  
 amLODIPine-benazepril 5-40 mg per capsule Commonly known as:  LOTREL  
TAKE 1 CAPSULE NIGHTLY FOR BLOOD PRESSURE  
  
 buPROPion 100 mg tablet Commonly known as:  [facility] Take 1 Tab by mouth two (2) times a day. levothyroxine 150 mcg tablet Commonly known as:  SYNTHROID Take 1 Tab by mouth Daily (before breakfast). metFORMIN 1,000 mg tablet Commonly known as:  GLUCOPHAGE  
TAKE 1 TABLET TWICE A DAY WITH MEALS  
  
 rosuvastatin 20 mg tablet Commonly known as:  CRESTOR Take 1 Tab by mouth daily. traZODone 50 mg tablet Commonly known as:  Sohail Greenfield Take 1 Tab by mouth nightly. * Notice: This list has 2 medication(s) that are the same as other medications prescribed for you. Read the directions carefully, and ask your doctor or other care provider to review them with you. Prescriptions Sent to Pharmacy Refills  
 traZODone (DESYREL) 50 mg tablet 2 Sig: Take 1 Tab by mouth nightly. Class: Normal  
 Pharmacy: [address] Ph #: [phone] Route: Oral  
 buPROPion (WELLBUTRIN) 100 mg tablet 0 Sig: Take 1 Tab by mouth two (2) times a day. Class: Normal  
 Pharmacy: [address] Ph #: [phone] Route: Oral  
  
Introducing [facility] & HEALTH SERVICES! [employer] introduces [employer] patient portal. Now you can access parts of your medical record, email your doctor's office, and request medication refills online. 1. In your internet browser, go to [email or web address] 2. Click on the First Time User? Click Here link in the Sign In box. You will see the New Member Sign Up page. 3. Enter your [employer] Access Code exactly as it appears below. You will not need to use this code after youve completed the sign-up process. If you do not sign up before the expiration date, you must request a new code. · [employer] Access Code: [email or web address] Expires: 2/14/2018  1:03 PM 
 
4. Enter the last four digits of your Social Security Number (xxxx) and Date of Birth (mm/dd/yyyy) as indicated and click Submit. You will be taken to the next sign-up page. 5. Create a [employer] ID. This will be your [employer] login ID and cannot be changed, so think of one that is secure and easy to remember. 6. Create a [employer] password. You can change your password at any time. 7. Enter your Password Reset Question and Answer. This can be used at a later time if you forget your password. 8. Enter your e-mail address. You will receive e-mail notification when new information is available in [address] 9. Click Sign Up. You can now view and download portions of your medical record. 10. Click the Download Summary menu link to download a portable copy of your medical information. If you have questions, please visit the Frequently Asked Questions section of the [employer] website. Remember, [employer] is NOT to be used for urgent needs. For medical emergencies, dial 911. Now available from your iPhone and Android! Please provide this summary of care documentation to your next provider. Your primary care clinician is listed as Darliss Angelucci If you have any questions after today's visit, please call [phone].

## 2018-01-23 ENCOUNTER — TELEPHONE (OUTPATIENT)
Dept: BEHAVIORAL/MENTAL HEALTH CLINIC | Age: 58
End: 2018-01-23

## 2018-01-23 DIAGNOSIS — F41.9 ANXIETY: Primary | ICD-10-CM

## 2018-01-23 NOTE — TELEPHONE ENCOUNTER
called and stated that his Xanax script is being placed on hold at his pharmacy because the script ran out. He would like to either talk to you or find out what he needs to do in order to get his medication. Please contact pt when you have a moment.

## 2018-01-24 ENCOUNTER — DOCUMENTATION ONLY (OUTPATIENT)
Dept: BEHAVIORAL/MENTAL HEALTH CLINIC | Age: 58
End: 2018-01-24

## 2018-01-24 RX ORDER — ALPRAZOLAM 0.5 MG/1
TABLET ORAL
Qty: 90 TAB | Refills: 1 | OUTPATIENT
Start: 2018-01-24 | End: 2018-04-16

## 2018-03-12 ENCOUNTER — OFFICE VISIT (OUTPATIENT)
Dept: BEHAVIORAL/MENTAL HEALTH CLINIC | Age: 58
End: 2018-03-12

## 2018-03-12 VITALS
DIASTOLIC BLOOD PRESSURE: 83 MMHG | BODY MASS INDEX: 29.55 KG/M2 | HEART RATE: 62 BPM | SYSTOLIC BLOOD PRESSURE: 148 MMHG | HEIGHT: 68 IN | WEIGHT: 195 LBS

## 2018-03-12 DIAGNOSIS — Z86.59 HISTORY OF PANIC ATTACKS: ICD-10-CM

## 2018-03-12 DIAGNOSIS — F33.42 RECURRENT MAJOR DEPRESSIVE DISORDER, IN FULL REMISSION (HCC): Primary | ICD-10-CM

## 2018-03-12 NOTE — PROGRESS NOTES
CHIEF COMPLAINT:  Cleopatra Campoverde is a 65 y.o. male and was seen today for follow-up of psychiatric condition and psychotropic medication management. Last office visit was Dec 2017. HPI:     Cleopatra Campoverde is a 65 y.o. male who presents with symptoms of depression and anxiety. His PMH is significant for DM type 2, dyslipidemia, and hypothyroidism secondary to thyroidectomy in 2012. He has no history of inpt or outpt psychiatric care. He denies a history substance abuse or physical/ sexual/ emotional abuse. He lost a daughter secondary to SIDS and another daughter in an MVC when she was 17yo. He lost a cousin and a close friend who passed from a self-inflicted GSW in Nov 6036. Mr. Dawood Feliciano reports onset of depression and anxiety with a panic attack occurring late Nov- early Dec 2106. Depressive symptoms include sad moods, poor motivation and energy, poor appetite, anhedonia (shopping, yard work, eating out), hypersomnolence, isolating behaviors, nervousness, hopeless and helpless feelings, and generalized anxiety. He doubts himself and is not as confident in his decisions. His family, who is supportive, have been concerned about his depression and anxiety. 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 He sees Dr. Estela Ibrahim for management of thyroid issues.     FAMILY/SOCIAL HX:  x >10 years to his 2nd wife, has 2 sons and an adult daughter, graduated high school, contractor with [employer] x 10 yrs, Baptism     REVIEW OF SYSTEMS:  Psychiatric:  normal  Appetite: good, weight decreased by 5 lbs.    Sleep: good, not needing his sleep aid  Neuro: none reported  JER: social drinker    Visit Vitals    /83    Pulse 62    Ht 5' 8\" (1.727 m)    Wt 88.5 kg (195 lb)    BMI 29.65 kg/m2         Side Effects:  none    MENTAL STATUS EXAM:   Sensorium  oriented to time, place and person   Relations cooperative   Appearance:  age appropriate and casually dressed   Motor Behavior:  gait stable and within normal limits   Speech:  normal pitch, normal volume and non-pressured   Thought Process: goal directed and logical   Thought Content free of delusions, free of hallucinations and not internally preoccupied    Suicidal ideations none   Homicidal ideations none   Mood:  euthymic   Affect:  euthymic   Memory recent  adequate   Memory remote:  adequate   Concentration:  adequate   Abstraction:  abstract   Insight:  good   Reliability good   Judgment:  good     MEDICAL DECISION MAKING:  Problems addressed today:    ICD-10-CM ICD-9-CM    1. Recurrent major depressive disorder, in full remission ([facility] 75.) F33.42 296.36    2. History of panic attacks Z86.59 V11.8        Assessment:   Sherren Honour is responding to treatment, symptoms are stable. He is off of the Wellbutrin and Xanax and Trazodone entirely x > 1 month and is doing well. He states that he is \"back\" and \"in life again. 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 He and his wife will go to [facility] for their birthdays at the end of the month. His son is still struggling with defiance and is \"barely\" in school. Sherren Honour may step in more as he does not feel that his son's mother is adequately handling their son's issues. Moods are stable. Current Outpatient Prescriptions   Medication Sig Dispense Refill    ALPRAZolam (XANAX) 0.5 mg tablet take 1 tablet by mouth three times a day if needed for anxiety 90 Tab 1    amLODIPine-benazepril (LOTREL) 5-40 mg per capsule TAKE 1 CAPSULE NIGHTLY FOR BLOOD PRESSURE 90 Cap 3    metFORMIN (GLUCOPHAGE) 1,000 mg tablet TAKE 1 TABLET TWICE A DAY WITH MEALS 180 Tab 3    rosuvastatin (CRESTOR) 20 mg tablet Take 1 Tab by mouth daily. 90 Tab 3    levothyroxine (SYNTHROID) 150 mcg tablet Take 1 Tab by mouth Daily (before breakfast). 90 Tab 3    traZODone (DESYREL) 50 mg tablet Take 1 Tab by mouth nightly.  30 Tab 2    buPROPion (WELLBUTRIN) 100 mg tablet Take 1 Tab by mouth two (2) times a day. 60 Tab 0    ALPRAZolam (XANAX) 1 mg tablet Take 1 Tab by mouth daily. Max Daily Amount: 1 mg. 30 Tab 1       Plan:   1. Medications/ Labs: He will continue Xanax 0.5mg- 1mg every day PRN severe anxiety, along with Trazodone 50mg qhs PRN insomnia. No rxs needed. Will follow-up one more time with him in the fall of 2018 and, if stable, discharge his from practice and he can continue with his PCP. 2.  Counseling and coordination of care including instructions for treatment, risks/benefits, risk factor reduction and patient/family education. He agrees with the plan. Patient instructed to call with any side effects, questions or issues. 3.  Follow-up Disposition:  Return in about 7 months (around 10/12/2018).     3/12/2018  Swapna Matias NP

## 2018-03-12 NOTE — MR AVS SNAPSHOT
[address] [facility] [address] 
 
 
 [address] [facility] [address] 
[phone] Patient: Mary Rowland MRN: [id number] :1960 Visit Information Date & Time Provider Department Dept. Phone Encounter #  
 3/12/2018  1:00 PM Nicki Malagon, [facility] [phone] [id number] Follow-up Instructions Return in about 7 months (around 10/12/2018). Follow-up and Disposition History Your Appointments 2018  9:50 AM  
ROUTINE CARE with Donald Dow MD  
[facility] [address] [place] [address]) Appt Note: f/u diabetes cp45.00  
 330 [facility] [address] [address]  
[address] [facility] [address]  
  
    
 10/15/2018 10:00 AM  
ESTABLISHED PATIENT with Nicki Malagon NP [facility] ([address] [place] [address]) Appt Note: Follow-up [address] [facility] [address] [facility] [address]  
  
   
 83[address] [facility] [address] [facility] [address] [facility] [address] Upcoming Health Maintenance Date Due Hepatitis C Screening 1960 Pneumococcal 19-64 Medium Risk (1 of 1 - PPSV23) 3/27/1980 DTaP/Tdap/Td series (1 - Tdap) 3/27/1982 FOBT Q 1 YEAR AGE 50-75 3/27/2011 Influenza Age 5 to Adult 2017 HEMOGLOBIN A1C Q6M 2018 MICROALBUMIN Q1 2018 LIPID PANEL Q1 2018 FOOT EXAM Q1 2018 EYE EXAM RETINAL OR DILATED Q1 2019 Allergies as of 3/12/2018  Review Complete On: 3/12/2018 By: Ella Shaikh No Known Allergies Current Immunizations  Never Reviewed No immunizations on file. Not reviewed this visit You Were Diagnosed With   
  
 Codes Comments Recurrent major depressive disorder, in full remission ([email or web address]    -  Primary ICD-10-CM: F33.42 
ICD-9-CM: 296.36 History of panic attacks     ICD-10-CM: Z86.59 
ICD-9-CM: V11.8 Vitals BP Pulse Height(growth percentile) Weight(growth percentile) BMI Smoking Status 148/83 62 5' 8\" (1.727 m) 195 lb (88.5 kg) 29.65 kg/m2 Former Smoker Vitals History BMI and BSA Data Body Mass Index Body Surface Area  
 29.65 kg/m 2 2.06 m 2 Preferred Pharmacy Pharmacy Name Phone [address] [phone] Your Updated Medication List  
  
   
This list is accurate as of 3/12/18  1:37 PM.  Always use your most recent med list.  
  
  
  
  
 * ALPRAZolam 1 mg tablet Commonly known as:  Ruben Ashfordck Take 1 Tab by mouth daily. Max Daily Amount: 1 mg.  
  
 * ALPRAZolam 0.5 mg tablet Commonly known as:  Ruben Ashfordck  
take 1 tablet by mouth three times a day if needed for anxiety  
  
 amLODIPine-benazepril 5-40 mg per capsule Commonly known as:  LOTREL  
TAKE 1 CAPSULE NIGHTLY FOR BLOOD PRESSURE  
  
 buPROPion 100 mg tablet Commonly known as:  [facility] Take 1 Tab by mouth two (2) times a day. levothyroxine 150 mcg tablet Commonly known as:  SYNTHROID Take 1 Tab by mouth Daily (before breakfast). metFORMIN 1,000 mg tablet Commonly known as:  GLUCOPHAGE  
TAKE 1 TABLET TWICE A DAY WITH MEALS  
  
 rosuvastatin 20 mg tablet Commonly known as:  CRESTOR Take 1 Tab by mouth daily. traZODone 50 mg tablet Commonly known as:  Mosetta Aixa Take 1 Tab by mouth nightly. * Notice: This list has 2 medication(s) that are the same as other medications prescribed for you. Read the directions carefully, and ask your doctor or other care provider to review them with you. Follow-up Instructions Return in about 7 months (around 10/12/2018). Introducing [facility] & HEALTH SERVICES! Mariam Mcneil introduces [employer] patient portal. Now you can access parts of your medical record, email your doctor's office, and request medication refills online.    
 
1. In your internet browser, go to [email or web address] 2. Click on the First Time User? Click Here link in the Sign In box. You will see the New Member Sign Up page. 3. Enter your [employer] Access Code exactly as it appears below. You will not need to use this code after youve completed the sign-up process. If you do not sign up before the expiration date, you must request a new code. · [employer] Access Code: [email or web address] Expires: 6/10/2018  1:06 PM 
 
4. Enter the last four digits of your Social Security Number (xxxx) and Date of Birth (mm/dd/yyyy) as indicated and click Submit. You will be taken to the next sign-up page. 5. Create a [employer] ID. This will be your [employer] login ID and cannot be changed, so think of one that is secure and easy to remember. 6. Create a [employer] password. You can change your password at any time. 7. Enter your Password Reset Question and Answer. This can be used at a later time if you forget your password. 8. Enter your e-mail address. You will receive e-mail notification when new information is available in [address] 9. Click Sign Up. You can now view and download portions of your medical record. 10. Click the Download Summary menu link to download a portable copy of your medical information. If you have questions, please visit the Frequently Asked Questions section of the [employer] website. Remember, [employer] is NOT to be used for urgent needs. For medical emergencies, dial 911. Now available from your iPhone and Android! Please provide this summary of care documentation to your next provider. Your primary care clinician is listed as Brianne Simeon If you have any questions after today's visit, please call [phone].

## 2018-04-10 LAB
EST. AVERAGE GLUCOSE BLD GHB EST-MCNC: 126 MG/DL
HBA1C MFR BLD: 6 % (ref 4.8–5.6)
TSH SERPL DL<=0.005 MIU/L-ACNC: 1.01 UIU/ML (ref 0.45–4.5)

## 2018-04-16 ENCOUNTER — OFFICE VISIT (OUTPATIENT)
Dept: ENDOCRINOLOGY | Age: 58
End: 2018-04-16

## 2018-04-16 VITALS
WEIGHT: 203 LBS | HEIGHT: 68 IN | DIASTOLIC BLOOD PRESSURE: 78 MMHG | SYSTOLIC BLOOD PRESSURE: 130 MMHG | BODY MASS INDEX: 30.77 KG/M2 | HEART RATE: 68 BPM

## 2018-04-16 DIAGNOSIS — I10 HTN (HYPERTENSION), BENIGN: ICD-10-CM

## 2018-04-16 DIAGNOSIS — E89.0 POSTOPERATIVE HYPOTHYROIDISM: ICD-10-CM

## 2018-04-16 DIAGNOSIS — E78.5 DYSLIPIDEMIA: ICD-10-CM

## 2018-04-16 DIAGNOSIS — E11.9 TYPE 2 DIABETES MELLITUS WITHOUT COMPLICATION, WITHOUT LONG-TERM CURRENT USE OF INSULIN (HCC): Primary | ICD-10-CM

## 2018-04-16 NOTE — MR AVS SNAPSHOT
[address] [facility] 
[phone] Patient: Calin Calvillo MRN: [id number] :1960 Visit Information Date & Time Provider Department Dept. Phone Encounter #  
 2018  9:50 AM Sean Calixto, [address] [place] [address] Diabetes and Endocrinology [phone] [id number] Follow-up Instructions Return in about 6 months (around 10/16/2018). Your Appointments 10/15/2018 10:00 AM  
ESTABLISHED PATIENT with Huber Dukes NP [facility] ([facility]) Appt Note: Follow-up [address] [facility] [address] [facility] [address]  
  
   
 [address] [facility] [address] Upcoming Health Maintenance Date Due Hepatitis C Screening 1960 Pneumococcal 19-64 Medium Risk (1 of 1 - PPSV23) 3/27/1980 DTaP/Tdap/Td series (1 - Tdap) 3/27/1982 FOBT Q 1 YEAR AGE 50-75 3/27/2011 Influenza Age 5 to Adult 2017 HEMOGLOBIN A1C Q6M 10/9/2018 MICROALBUMIN Q1 2018 LIPID PANEL Q1 2018 FOOT EXAM Q1 2018 EYE EXAM RETINAL OR DILATED Q1 2019 Allergies as of 2018  Review Complete On: 2018 By: Sean Calixto MD  
 No Known Allergies Current Immunizations  Never Reviewed No immunizations on file. Not reviewed this visit You Were Diagnosed With   
  
 Codes Comments Type 2 diabetes mellitus without complication, without long-term current use of insulin (HCC)    -  Primary ICD-10-CM: E11.9 ICD-9-CM: 250.00 Postoperative hypothyroidism     ICD-10-CM: E89.0 ICD-9-CM: 244.0 Dyslipidemia     ICD-10-CM: E78.5 ICD-9-CM: 272.4 HTN (hypertension), benign     ICD-10-CM: I10 
ICD-9-CM: 401.1 BMI 30.0-30.9,adult     ICD-10-CM: Z68.30 ICD-9-CM: V85.30 Vitals BP Pulse Height(growth percentile) Weight(growth percentile) BMI Smoking Status 130/78 68 5' 8\" (1.727 m) 203 lb (92.1 kg) 30.87 kg/m2 Former Smoker Vitals History BMI and BSA Data Body Mass Index Body Surface Area  
 30.87 kg/m 2 2.1 m 2 Preferred Pharmacy Pharmacy Name Phone Fanny Mcneill, [address] [phone] Your Updated Medication List  
  
   
This list is accurate as of 4/16/18 10:25 AM.  Always use your most recent med list. amLODIPine-benazepril 5-40 mg per capsule Commonly known as:  LOTREL  
TAKE 1 CAPSULE NIGHTLY FOR BLOOD PRESSURE  
  
 levothyroxine 150 mcg tablet Commonly known as:  SYNTHROID Take 1 Tab by mouth Daily (before breakfast). metFORMIN 1,000 mg tablet Commonly known as:  GLUCOPHAGE  
TAKE 1 TABLET TWICE A DAY WITH MEALS  
  
 rosuvastatin 20 mg tablet Commonly known as:  CRESTOR Take 1 Tab by mouth daily. We Performed the Following HEMOGLOBIN A1C WITH EAG [32106 CPT(R)] LIPID PANEL [97965 CPT(R)] METABOLIC PANEL, COMPREHENSIVE [50429 CPT(R)] MICROALBUMIN, UR, RAND W/ MICROALB/CREAT RATIO [id number] CPT(R)] TSH 3RD GENERATION [49670 CPT(R)] Follow-up Instructions Return in about 6 months (around 10/16/2018). Patient Instructions Thyroid: 
Continue 150 mcg - This dose is working well for you. Continue to assure 7 tablets/week Diabetes:  
Continue metformin - 1000 mg twice daily - work on decreasing portions of foods in order to lose 10 lbs or so. Cholesterol:  
Continue rosuvastatin 20 mg. Blood pressure 
 continue amlodipine/benazepril - continue taking at night Goal BP at home: < 130.  < 120 is optimal  
 
 
  
Introducing [employer]! [employer] introduces [email or web address] patient portal. Now you can access parts of your medical record, email your doctor's office, and request medication refills online. 1. In your internet browser, go to [email or web address] 2. Click on the First Time User? Click Here link in the Sign In box. You will see the New Member Sign Up page. 3. Enter your [employer] Access Code exactly as it appears below. You will not need to use this code after youve completed the sign-up process. If you do not sign up before the expiration date, you must request a new code. · [employer] Access Code: [email or web address] Expires: 6/10/2018  1:06 PM 
 
4. Enter the last four digits of your Social Security Number (xxxx) and Date of Birth (mm/dd/yyyy) as indicated and click Submit. You will be taken to the next sign-up page. 5. Create a [employer] ID. This will be your [employer] login ID and cannot be changed, so think of one that is secure and easy to remember. 6. Create a [employer] password. You can change your password at any time. 7. Enter your Password Reset Question and Answer. This can be used at a later time if you forget your password. 8. Enter your e-mail address. You will receive e-mail notification when new information is available in [address] 9. Click Sign Up. You can now view and download portions of your medical record. 10. Click the Download Summary menu link to download a portable copy of your medical information. If you have questions, please visit the Frequently Asked Questions section of the [employer] website. Remember, [employer] is NOT to be used for urgent needs. For medical emergencies, dial 911. Now available from your iPhone and Android! Please provide this summary of care documentation to your next provider. Your primary care clinician is listed as Quitaque Filler If you have any questions after today's visit, please call [phone].

## 2018-04-16 NOTE — PROGRESS NOTES
History of Present Illness: Maldonado Henry is a 63 y.o. male presents for follow-up of diabetes. He also has HTN and dyslipidemia. He also has problems with depression and anxiety, which have improved. Hypothyroid since thyroidectomy in 2012. Currently taking 150 mcg. He is getting all 7 doses/week (making up for missed doses if this occurs)    Diabetes   Dx around 2012. No complications. Medications: Taking metformin 1 g BID    Exercise -still exercising. Diet: eating more vegetables and fruits. Weight is overall stable     Lipids: rosuvastatin 20 mg     HTN - + history of microalbuminuria in 2014. Now normal.  BP controlled   Taking amlodipine/benazepril at night. 348-195 is systolic when he checks at home. Social:  . Wife is helping with diet        Past Medical History:   Diagnosis Date    Diabetes mellitus type II     Dyslipidemia     Goiter     Hypertension     Hypothyroidism     Psychiatric disorder     aniexty     Current Outpatient Prescriptions   Medication Sig    amLODIPine-benazepril (LOTREL) 5-40 mg per capsule TAKE 1 CAPSULE NIGHTLY FOR BLOOD PRESSURE    metFORMIN (GLUCOPHAGE) 1,000 mg tablet TAKE 1 TABLET TWICE A DAY WITH MEALS    rosuvastatin (CRESTOR) 20 mg tablet Take 1 Tab by mouth daily.  levothyroxine (SYNTHROID) 150 mcg tablet Take 1 Tab by mouth Daily (before breakfast). No current facility-administered medications for this visit.       No Known Allergies    Review of Systems:  - Eyes: no blurry vision or double vision  - Cardiovascular: no chest pain  - Respiratory: no shortness of breath  - Musculoskeletal: no myalgias  - Neurological: no numbness/tingling in extremities    Physical Examination:  Visit Vitals    /78    Pulse 68    Ht 5' 8\" (1.727 m)    Wt 203 lb (92.1 kg)    BMI 30.87 kg/m2   -   - General: pleasant, no distress, normal gait   HEENT: hearing intact, EOMI, clear sclera without icterus  - Cardiovascular: regular, normal rate - Respiratory: normal effort  - Integumentary: no edema  - Psychiatric: normal mood and affect    Data Reviewed:   Component      Latest Ref Rng & Units 4/9/2018 4/9/2018 11/14/2017 11/14/2017           9:14 AM  9:14 AM  8:17 AM  8:17 AM   Cholesterol, total      100 - 199 mg/dL    208 (H)   Triglyceride      0 - 149 mg/dL    116   HDL Cholesterol      >39 mg/dL    61   VLDL, calculated      5 - 40 mg/dL    23   LDL, calculated      0 - 99 mg/dL    124 (H)   Creatinine, urine      Not Estab. mg/dL   248.5    Microalbumin, urine      Not Estab. ug/mL   24.4    Microalbumin/Creat. Ratio      0.0 - 30.0 mg/g creat   9.8    Hemoglobin A1c, (calculated)      4.8 - 5.6 % 6.0 (H)      Estimated average glucose      mg/dL 126      TSH      0.450 - 4.500 uIU/mL  1.010         Assessment/Plan:   1. Type 2 diabetes mellitus without complication, without long-term current use of insulin (HCC)   - continue metformin and diet and exercise efforts   2. Postoperative hypothyroidism   - at goal on 150 mcg. continue   3. Dyslipidemia   - continue atorvastatin and wt loss efforts   4. HTN (hypertension), benign   - continue amlodipine/benazepril   5. BMI 30.0-30.9,adult -  Reviewed how weight loss will hlp BP, lipids, diabetes  - recommend efforts with smaller portions. Patient Instructions   Thyroid:  Continue 150 mcg - This dose is working well for you. Continue to assure 7 tablets/week    Diabetes:   Continue metformin - 1000 mg twice daily   - work on decreasing portions of foods in order to lose 10 lbs or so. Cholesterol:   Continue rosuvastatin 20 mg. Blood pressure   continue amlodipine/benazepril - continue taking at night  Goal BP at home: < 130.  < 120 is optimal     Follow-up Disposition:  Return in about 6 months (around 10/16/2018).     Copy sent to:

## 2018-04-16 NOTE — PATIENT INSTRUCTIONS
Thyroid:  Continue 150 mcg - This dose is working well for you. Continue to assure 7 tablets/week    Diabetes:   Continue metformin - 1000 mg twice daily   - work on decreasing portions of foods in order to lose 10 lbs or so. Cholesterol:   Continue rosuvastatin 20 mg.       Blood pressure   continue amlodipine/benazepril - continue taking at night  Goal BP at home: < 130.  < 120 is optimal

## 2018-07-31 RX ORDER — LEVOTHYROXINE SODIUM 150 UG/1
TABLET ORAL
Qty: 90 TAB | Refills: 3 | Status: SHIPPED | OUTPATIENT
Start: 2018-07-31 | End: 2019-06-03 | Stop reason: DRUGHIGH

## 2018-10-15 RX ORDER — ROSUVASTATIN CALCIUM 20 MG/1
TABLET, COATED ORAL
Qty: 90 TAB | Refills: 3 | Status: SHIPPED | OUTPATIENT
Start: 2018-10-15

## 2018-11-14 ENCOUNTER — HOSPITAL ENCOUNTER (OUTPATIENT)
Dept: GENERAL RADIOLOGY | Age: 58
Discharge: HOME | End: 2018-11-14
Payer: COMMERCIAL

## 2018-11-14 DIAGNOSIS — M54.2 NECK PAIN: ICD-10-CM

## 2018-11-14 PROCEDURE — 72050 X-RAY EXAM NECK SPINE 4/5VWS: CPT

## 2019-05-30 ENCOUNTER — TELEPHONE (OUTPATIENT)
Dept: ENDOCRINOLOGY | Age: 59
End: 2019-05-30

## 2019-05-30 DIAGNOSIS — E11.9 TYPE 2 DIABETES MELLITUS WITHOUT COMPLICATION, WITHOUT LONG-TERM CURRENT USE OF INSULIN (HCC): Primary | ICD-10-CM

## 2019-05-30 DIAGNOSIS — E11.9 TYPE 2 DIABETES MELLITUS WITHOUT COMPLICATION, WITHOUT LONG-TERM CURRENT USE OF INSULIN (HCC): ICD-10-CM

## 2019-05-30 NOTE — TELEPHONE ENCOUNTER
5/30/2019  9:21 AM    Patient called in stating that he is at [facility] to get labs drawn but does not have any labs in the system. He stated his appointment is tomorrow.       Thanks

## 2019-05-30 NOTE — TELEPHONE ENCOUNTER
Called pt mobile phone. X1 ID verified. Advised pt that lab orders have been placed in the system and he can go to any [facility] to obtain these labs. Advised pt that the results will possibly not be in tomorrow but if he has them done we will be able to contact him with results over the phone. Just informed pt to bring in meter or log to go over with Dr. Fanta Casanova. Pt verbalized understanding and no questions or concerns voiced at this time.

## 2019-05-31 ENCOUNTER — OFFICE VISIT (OUTPATIENT)
Dept: ENDOCRINOLOGY | Age: 59
End: 2019-05-31

## 2019-05-31 VITALS
BODY MASS INDEX: 31.74 KG/M2 | HEIGHT: 68 IN | DIASTOLIC BLOOD PRESSURE: 82 MMHG | WEIGHT: 209.4 LBS | SYSTOLIC BLOOD PRESSURE: 121 MMHG | HEART RATE: 89 BPM

## 2019-05-31 DIAGNOSIS — I10 HTN (HYPERTENSION), BENIGN: ICD-10-CM

## 2019-05-31 DIAGNOSIS — E78.5 DYSLIPIDEMIA: ICD-10-CM

## 2019-05-31 DIAGNOSIS — E89.0 POSTOPERATIVE HYPOTHYROIDISM: ICD-10-CM

## 2019-05-31 DIAGNOSIS — E11.9 TYPE 2 DIABETES MELLITUS WITHOUT COMPLICATION, WITHOUT LONG-TERM CURRENT USE OF INSULIN (HCC): Primary | ICD-10-CM

## 2019-05-31 NOTE — PROGRESS NOTES
History of Present Illness: Jackeline Blankenship is a 63 y.o. male presents for follow-up of diabetes. He also has HTN and dyslipidemia. He also has problems with depression and anxiety    Hypothyroid since thyroidectomy in 2012. Currently taking 150 mcg. He is getting all 7 doses/week (making up for missed doses if this occurs)    Diabetes   Dx around 2012. No complications. Medications: Taking metformin 1 g BID  Glucose was 150-180 several weeks ago fasting. 101 after gym    Exercise -still exercising. Diet: was worse for last several weeks while on vacation. Plans to get back on track. Weight is overall stable     Lipids: rosuvastatin 20 mg     HTN - + history of microalbuminuria in 2014. Now normal.  BP controlled   Taking amlodipine/benazepril at night. 432-552 is systolic when he checks at home. Social:  . 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 Vacationed to [facility]    Past Medical History:   Diagnosis Date    Diabetes mellitus type II     Dyslipidemia     Goiter     Hypertension     Hypothyroidism     Psychiatric disorder     aniexty     Current Outpatient Medications   Medication Sig    amLODIPine-benazepril (LOTREL) 5-40 mg per capsule TAKE 1 CAPSULE NIGHTLY FOR BLOOD PRESSURE    metFORMIN (GLUCOPHAGE) 1,000 mg tablet TAKE 1 TABLET TWICE A DAY WITH MEALS    rosuvastatin (CRESTOR) 20 mg tablet TAKE 1 TABLET DAILY    levothyroxine (SYNTHROID) 150 mcg tablet TAKE 1 TABLET DAILY BEFORE BREAKFAST     No current facility-administered medications for this visit.       No Known Allergies    Review of Systems:  - Eyes: no blurry vision or double vision  - Cardiovascular: no chest pain  - Respiratory: no shortness of breath  - Musculoskeletal: no myalgias  - Neurological: no numbness/tingling in extremities    Physical Examination:  Visit Vitals  /82 (BP 1 Location: Left arm, BP Patient Position: Sitting)   Pulse 89   Ht 5' 8\" (1.727 m)   Wt 209 lb 6.4 oz (95 kg)   BMI 31.84 kg/m²   -   - General: pleasant, no distress, normal gait   HEENT: hearing intact, EOMI, clear sclera without icterus  - Cardiovascular: regular, normal rate   - Respiratory: normal effort  - Integumentary: no edema   Diabetic foot exam:     Left Foot:   Visual Exam: normal    Pulse DP: 2+ (normal)   Filament test: normal sensation       Right Foot:   Visual Exam: normal    Pulse DP: 2+ (normal)   Filament test: normal sensation    Psychiatric: normal mood and affect    Data Reviewed:   Lab Results   Component Value Date/Time    Hemoglobin A1c 6.0 04/09/2018 09:14 AM      No results found for: NA, K, CREA, MCACR, CREATEXT     No results found for: CHOL, HDL, LDLC, LDL, LDLCEXT, TRIGL   Lab Results   Component Value Date/Time    TSH 1.010 04/09/2018 09:14 AM        Assessment/Plan:   1. Type 2 diabetes mellitus without complication, without long-term current use of insulin (HCC)   - Await A1c.  - if elevated, he will make lifestyle efforts with diet and exercise and weight loss  - reviewed importance of weight loss for diabetes success  - discussed potential to use GLP-1 agonists which have weight favorable effects  - continue metformin. 2. Postoperative hypothyroidism   - TSH on 150 mcg. 3. Dyslipidemia   Labs on rosuvastatin. Wt loss encouraged   4. HTN (hypertension), benign   BP controlled. Continue amlodipine/benazepril    5. BMI 31.0-31.9,adult   - smaller portions encouraged. Patient Instructions   Thyroid:  Continue 150 mcg - This dose is working well for you. Continue to assure 7 tablets/week    Diabetes:   Continue metformin - 1000 mg twice daily   - work on decreasing portions of foods in order to lose 10 lbs or so. Cholesterol:   Continue rosuvastatin 20 mg. Blood pressure   continue amlodipine/benazepril - continue taking at night  Goal BP at home: < 130.  < 120 is optimal     Follow-up and Dispositions    · Return in about 6 months (around 11/30/2019). Copy sent to:

## 2019-06-01 LAB
ALBUMIN SERPL-MCNC: 4.6 G/DL (ref 3.5–5.5)
ALBUMIN/CREAT UR: 33.3 MG/G CREAT (ref 0–30)
ALBUMIN/GLOB SERPL: 1.7 {RATIO} (ref 1.2–2.2)
ALP SERPL-CCNC: 69 IU/L (ref 39–117)
ALT SERPL-CCNC: 37 IU/L (ref 0–44)
AST SERPL-CCNC: 38 IU/L (ref 0–40)
BILIRUB SERPL-MCNC: 0.5 MG/DL (ref 0–1.2)
BUN SERPL-MCNC: 11 MG/DL (ref 6–24)
BUN/CREAT SERPL: 10 (ref 9–20)
CALCIUM SERPL-MCNC: 10.1 MG/DL (ref 8.7–10.2)
CHLORIDE SERPL-SCNC: 95 MMOL/L (ref 96–106)
CHOLEST SERPL-MCNC: 244 MG/DL (ref 100–199)
CO2 SERPL-SCNC: 26 MMOL/L (ref 20–29)
CREAT SERPL-MCNC: 1.09 MG/DL (ref 0.76–1.27)
CREAT UR-MCNC: 134.1 MG/DL
EST. AVERAGE GLUCOSE BLD GHB EST-MCNC: 200 MG/DL
GLOBULIN SER CALC-MCNC: 2.7 G/DL (ref 1.5–4.5)
GLUCOSE SERPL-MCNC: 254 MG/DL (ref 65–99)
HBA1C MFR BLD: 8.6 % (ref 4.8–5.6)
HDLC SERPL-MCNC: 64 MG/DL
LDLC SERPL CALC-MCNC: 126 MG/DL (ref 0–99)
MICROALBUMIN UR-MCNC: 44.7 UG/ML
POTASSIUM SERPL-SCNC: 4.2 MMOL/L (ref 3.5–5.2)
PROT SERPL-MCNC: 7.3 G/DL (ref 6–8.5)
SODIUM SERPL-SCNC: 136 MMOL/L (ref 134–144)
TRIGL SERPL-MCNC: 272 MG/DL (ref 0–149)
TSH SERPL DL<=0.005 MIU/L-ACNC: 29.69 UIU/ML (ref 0.45–4.5)
VLDLC SERPL CALC-MCNC: 54 MG/DL (ref 5–40)

## 2019-06-03 DIAGNOSIS — E11.9 TYPE 2 DIABETES MELLITUS WITHOUT COMPLICATION, WITHOUT LONG-TERM CURRENT USE OF INSULIN (HCC): ICD-10-CM

## 2019-06-03 DIAGNOSIS — E89.0 POSTOPERATIVE HYPOTHYROIDISM: Primary | ICD-10-CM

## 2019-06-03 RX ORDER — LEVOTHYROXINE SODIUM 175 UG/1
175 TABLET ORAL
Qty: 90 TAB | Refills: 3 | Status: SHIPPED | OUTPATIENT
Start: 2019-06-03

## 2019-06-03 NOTE — PROGRESS NOTES
Hypothyroid on 150 mcg. May need a dose increase, unless he has missed tablets frequently. Diabetes and dyslipidemia poorly controlled. Will recommend follow-up in 3 months.

## 2019-12-16 ENCOUNTER — HOSPITAL ENCOUNTER (OUTPATIENT)
Dept: NON INVASIVE DIAGNOSTICS | Age: 59
Discharge: HOME | End: 2019-12-16
Payer: COMMERCIAL

## 2019-12-16 DIAGNOSIS — Z01.811 PRE-OP CHEST EXAM: ICD-10-CM

## 2019-12-16 LAB
ATRIAL RATE: 69 BPM
CALCULATED P AXIS, ECG09: 34 DEGREES
CALCULATED R AXIS, ECG10: 5 DEGREES
CALCULATED T AXIS, ECG11: 6 DEGREES
DIAGNOSIS, 93000: NORMAL
P-R INTERVAL, ECG05: 138 MS
Q-T INTERVAL, ECG07: 428 MS
QRS DURATION, ECG06: 92 MS
QTC CALCULATION (BEZET), ECG08: 458 MS
VENTRICULAR RATE, ECG03: 69 BPM

## 2019-12-16 PROCEDURE — 93005 ELECTROCARDIOGRAM TRACING: CPT
